# Patient Record
Sex: MALE | Race: WHITE | NOT HISPANIC OR LATINO | Employment: OTHER | URBAN - METROPOLITAN AREA
[De-identification: names, ages, dates, MRNs, and addresses within clinical notes are randomized per-mention and may not be internally consistent; named-entity substitution may affect disease eponyms.]

---

## 2017-03-14 ENCOUNTER — HOSPITAL ENCOUNTER (OUTPATIENT)
Dept: ONCOLOGY | Facility: CLINIC | Age: 37
Discharge: HOME | End: 2017-03-14
Attending: INTERNAL MEDICINE | Admitting: INTERNAL MEDICINE

## 2017-03-14 LAB
ALBUMIN SERPL-MCNC: 3.7 G/DL (ref 3.5–4.8)
ALBUMIN/GLOB SERPL: 1.3 {RATIO} (ref 1–1.7)
ALP SERPL-CCNC: 61 IU/L (ref 32–91)
ALT SERPL-CCNC: 34 IU/L (ref 17–63)
ANION GAP SERPL CALC-SCNC: 16.5 MMOL/L (ref 10–20)
AST SERPL-CCNC: 28 IU/L (ref 15–41)
BILIRUB SERPL-MCNC: 0.6 MG/DL (ref 0.3–1.2)
BUN SERPL-MCNC: 10 MG/DL (ref 8–20)
BUN/CREAT SERPL: 11.1 (ref 6.2–20.3)
CALCIUM SERPL-MCNC: 9.4 MG/DL (ref 8.9–10.3)
CHLORIDE SERPL-SCNC: 107 MMOL/L (ref 101–111)
CONV CO2: 23 MMOL/L (ref 22–32)
CONV TOTAL PROTEIN: 6.5 G/DL (ref 6.1–7.9)
CREAT UR-MCNC: 0.9 MG/DL (ref 0.7–1.2)
GLOBULIN UR ELPH-MCNC: 2.8 G/DL (ref 2.5–3.8)
GLUCOSE SERPL-MCNC: 96 MG/DL (ref 65–99)
POTASSIUM SERPL-SCNC: 4.5 MMOL/L (ref 3.6–5.1)
SODIUM SERPL-SCNC: 142 MMOL/L (ref 136–144)

## 2017-09-12 ENCOUNTER — CLINICAL SUPPORT (OUTPATIENT)
Dept: ONCOLOGY | Facility: HOSPITAL | Age: 37
End: 2017-09-12

## 2017-09-12 ENCOUNTER — HOSPITAL ENCOUNTER (OUTPATIENT)
Dept: ONCOLOGY | Facility: HOSPITAL | Age: 37
Discharge: HOME | End: 2017-09-12
Attending: INTERNAL MEDICINE | Admitting: INTERNAL MEDICINE

## 2017-09-12 ENCOUNTER — HOSPITAL ENCOUNTER (OUTPATIENT)
Dept: ONCOLOGY | Facility: CLINIC | Age: 37
Setting detail: INFUSION SERIES
Discharge: HOME | End: 2017-09-12
Attending: INTERNAL MEDICINE | Admitting: INTERNAL MEDICINE

## 2017-09-12 LAB
ALBUMIN SERPL-MCNC: 3.9 G/DL (ref 3.5–4.8)
ALBUMIN/GLOB SERPL: 1.3 {RATIO} (ref 1–1.7)
ALP SERPL-CCNC: 56 IU/L (ref 32–91)
ALT SERPL-CCNC: 29 IU/L (ref 17–63)
ANION GAP SERPL CALC-SCNC: 11.8 MMOL/L (ref 10–20)
AST SERPL-CCNC: 31 IU/L (ref 15–41)
BILIRUB SERPL-MCNC: 1 MG/DL (ref 0.3–1.2)
BUN SERPL-MCNC: 5 MG/DL (ref 8–20)
BUN/CREAT SERPL: 5.6 (ref 6.2–20.3)
CALCIUM SERPL-MCNC: 9.3 MG/DL (ref 8.9–10.3)
CHLORIDE SERPL-SCNC: 106 MMOL/L (ref 101–111)
CONV CO2: 24 MMOL/L (ref 22–32)
CONV TOTAL PROTEIN: 6.9 G/DL (ref 6.1–7.9)
CREAT UR-MCNC: 0.9 MG/DL (ref 0.7–1.2)
GLOBULIN UR ELPH-MCNC: 3 G/DL (ref 2.5–3.8)
GLUCOSE SERPL-MCNC: 103 MG/DL (ref 65–99)
POTASSIUM SERPL-SCNC: 3.8 MMOL/L (ref 3.6–5.1)
SODIUM SERPL-SCNC: 138 MMOL/L (ref 136–144)

## 2017-09-12 NOTE — PROGRESS NOTES
PATIENTS ONCOLOGY RECORD LOCATED IN [facility]      Subjective     Name:  ARMANDO FERRARI     Date:  2017  Address:  [address] [place] [address] [place] [facility]  Home: [phone]  :  1980 AGE:  36 y.o.        RECORDS OBTAINED:  Patients Oncology Record is located in [facility]

## 2017-10-11 ENCOUNTER — HOSPITAL ENCOUNTER (OUTPATIENT)
Dept: OTHER | Facility: HOSPITAL | Age: 37
Setting detail: SPECIMEN
Discharge: HOME | End: 2017-10-11
Attending: NURSE PRACTITIONER | Admitting: NURSE PRACTITIONER

## 2017-10-11 LAB
ALBUMIN SERPL-MCNC: 4.2 G/DL (ref 3.5–4.8)
ALBUMIN/GLOB SERPL: 1.4 {RATIO} (ref 1–1.7)
ALP SERPL-CCNC: 57 IU/L (ref 32–91)
ALT SERPL-CCNC: 18 IU/L (ref 17–63)
ANION GAP SERPL CALC-SCNC: 12.6 MMOL/L (ref 10–20)
AST SERPL-CCNC: 18 IU/L (ref 15–41)
BILIRUB SERPL-MCNC: 1.5 MG/DL (ref 0.3–1.2)
BUN SERPL-MCNC: 6 MG/DL (ref 8–20)
BUN/CREAT SERPL: 6.7 (ref 6.2–20.3)
CALCIUM SERPL-MCNC: 9.4 MG/DL (ref 8.9–10.3)
CHLORIDE SERPL-SCNC: 102 MMOL/L (ref 101–111)
CHOLEST SERPL-MCNC: 213 MG/DL
CHOLEST/HDLC SERPL: 6.2 {RATIO}
CONV CO2: 27 MMOL/L (ref 22–32)
CONV LDL CHOLESTEROL DIRECT: 152 MG/DL (ref 0–100)
CONV TOTAL PROTEIN: 7.3 G/DL (ref 6.1–7.9)
CREAT UR-MCNC: 0.9 MG/DL (ref 0.7–1.2)
GLOBULIN UR ELPH-MCNC: 3.1 G/DL (ref 2.5–3.8)
GLUCOSE SERPL-MCNC: 80 MG/DL (ref 65–99)
HDLC SERPL-MCNC: 34 MG/DL
LDLC/HDLC SERPL: 4.4 {RATIO}
LIPID INTERPRETATION: ABNORMAL
POTASSIUM SERPL-SCNC: 4.6 MMOL/L (ref 3.6–5.1)
SODIUM SERPL-SCNC: 137 MMOL/L (ref 136–144)
TRIGL SERPL-MCNC: 157 MG/DL
VLDLC SERPL CALC-MCNC: 26.7 MG/DL

## 2017-10-24 ENCOUNTER — HOSPITAL ENCOUNTER (OUTPATIENT)
Dept: ULTRASOUND IMAGING | Facility: HOSPITAL | Age: 37
Discharge: HOME | End: 2017-10-24
Attending: NURSE PRACTITIONER | Admitting: NURSE PRACTITIONER

## 2017-11-27 ENCOUNTER — OFFICE (OUTPATIENT)
Dept: URBAN - METROPOLITAN AREA CLINIC 64 | Facility: CLINIC | Age: 37
End: 2017-11-27
Payer: COMMERCIAL

## 2017-11-27 VITALS
HEART RATE: 77 BPM | WEIGHT: 240 LBS | SYSTOLIC BLOOD PRESSURE: 125 MMHG | HEIGHT: 69 IN | DIASTOLIC BLOOD PRESSURE: 73 MMHG

## 2017-11-27 DIAGNOSIS — R10.84 GENERALIZED ABDOMINAL PAIN: ICD-10-CM

## 2017-11-27 DIAGNOSIS — E80.6 OTHER DISORDERS OF BILIRUBIN METABOLISM: ICD-10-CM

## 2017-11-27 DIAGNOSIS — R19.4 CHANGE IN BOWEL HABIT: ICD-10-CM

## 2017-11-27 DIAGNOSIS — K21.9 GASTRO-ESOPHAGEAL REFLUX DISEASE WITHOUT ESOPHAGITIS: ICD-10-CM

## 2017-11-27 LAB
BILIRUBIN, TOTAL/DIRECT, SERUM: BILIRUBIN, DIRECT: 0.16 MG/DL (ref 0–0.4)
BILIRUBIN, TOTAL/DIRECT, SERUM: BILIRUBIN, INDIRECT: 0.44 MG/DL (ref 0.1–0.8)
BILIRUBIN, TOTAL/DIRECT, SERUM: BILIRUBIN, TOTAL: 0.6 MG/DL (ref 0–1.2)

## 2017-11-27 PROCEDURE — 99203 OFFICE O/P NEW LOW 30 MIN: CPT | Performed by: INTERNAL MEDICINE

## 2017-11-27 RX ORDER — DICYCLOMINE HYDROCHLORIDE 20 MG/1
80 TABLET ORAL
Qty: 120 | Refills: 12 | Status: COMPLETED
Start: 2017-11-27 | End: 2024-02-23

## 2018-02-20 ENCOUNTER — HOSPITAL ENCOUNTER (OUTPATIENT)
Dept: LAB | Facility: HOSPITAL | Age: 38
Discharge: HOME | End: 2018-02-20
Attending: PSYCHIATRY & NEUROLOGY | Admitting: PSYCHIATRY & NEUROLOGY

## 2018-02-20 LAB
ALBUMIN SERPL-MCNC: 3.8 G/DL (ref 3.5–4.8)
ALBUMIN/GLOB SERPL: 1.4 {RATIO} (ref 1–1.7)
ALP SERPL-CCNC: 46 IU/L (ref 32–91)
ALT SERPL-CCNC: 13 IU/L (ref 17–63)
ANION GAP SERPL CALC-SCNC: 12.3 MMOL/L (ref 10–20)
AST SERPL-CCNC: 15 IU/L (ref 15–41)
BASOPHILS # BLD AUTO: 0.1 10*3/UL (ref 0–0.2)
BASOPHILS NFR BLD AUTO: 1 % (ref 0–2)
BILIRUB SERPL-MCNC: 0.9 MG/DL (ref 0.3–1.2)
BUN SERPL-MCNC: 12 MG/DL (ref 8–20)
BUN/CREAT SERPL: 10.9 (ref 6.2–20.3)
CALCIUM SERPL-MCNC: 9.4 MG/DL (ref 8.9–10.3)
CHLORIDE SERPL-SCNC: 105 MMOL/L (ref 101–111)
CONV CO2: 24 MMOL/L (ref 22–32)
CONV TOTAL PROTEIN: 6.5 G/DL (ref 6.1–7.9)
CREAT UR-MCNC: 1.1 MG/DL (ref 0.7–1.2)
DIFFERENTIAL METHOD BLD: (no result)
EOSINOPHIL # BLD AUTO: 0.5 10*3/UL (ref 0–0.3)
EOSINOPHIL # BLD AUTO: 5 % (ref 0–3)
ERYTHROCYTE [DISTWIDTH] IN BLOOD BY AUTOMATED COUNT: 13.2 % (ref 11.5–14.5)
GLOBULIN UR ELPH-MCNC: 2.7 G/DL (ref 2.5–3.8)
GLUCOSE SERPL-MCNC: 94 MG/DL (ref 65–99)
HCT VFR BLD AUTO: 43.2 % (ref 40–54)
HGB BLD-MCNC: 14.8 G/DL (ref 14–18)
LITHIUM SERPL-SCNC: 0.7 MEQ/L (ref 1–1.2)
LYMPHOCYTES # BLD AUTO: 2.9 10*3/UL (ref 0.8–4.8)
LYMPHOCYTES NFR BLD AUTO: 25 % (ref 18–42)
MCH RBC QN AUTO: 30.6 PG (ref 26–32)
MCHC RBC AUTO-ENTMCNC: 34.4 G/DL (ref 32–36)
MCV RBC AUTO: 89.1 FL (ref 80–94)
MONOCYTES # BLD AUTO: 0.7 10*3/UL (ref 0.1–1.3)
MONOCYTES NFR BLD AUTO: 6 % (ref 2–11)
NEUTROPHILS # BLD AUTO: 7.2 10*3/UL (ref 2.3–8.6)
NEUTROPHILS NFR BLD AUTO: 63 % (ref 50–75)
NRBC BLD AUTO-RTO: 0 /100{WBCS}
NRBC/RBC NFR BLD MANUAL: 0 10*3/UL
PLATELET # BLD AUTO: 273 10*3/UL (ref 150–450)
PMV BLD AUTO: 9.9 FL (ref 7.4–10.4)
POTASSIUM SERPL-SCNC: 4.3 MMOL/L (ref 3.6–5.1)
RBC # BLD AUTO: 4.84 10*6/UL (ref 4.6–6)
SODIUM SERPL-SCNC: 137 MMOL/L (ref 136–144)
VALPROATE SERPL-MCNC: 109.9 UG/ML (ref 50–100)
WBC # BLD AUTO: 11.3 10*3/UL (ref 4.5–11.5)

## 2018-02-28 ENCOUNTER — HOSPITAL ENCOUNTER (OUTPATIENT)
Dept: CARDIOLOGY | Facility: HOSPITAL | Age: 38
Discharge: HOME | End: 2018-02-28
Attending: INTERNAL MEDICINE | Admitting: INTERNAL MEDICINE

## 2018-03-13 ENCOUNTER — CLINICAL SUPPORT (OUTPATIENT)
Dept: ONCOLOGY | Facility: HOSPITAL | Age: 38
End: 2018-03-13

## 2018-03-13 ENCOUNTER — HOSPITAL ENCOUNTER (OUTPATIENT)
Dept: ONCOLOGY | Facility: CLINIC | Age: 38
Setting detail: INFUSION SERIES
Discharge: HOME | End: 2018-03-13
Attending: INTERNAL MEDICINE | Admitting: INTERNAL MEDICINE

## 2018-03-13 ENCOUNTER — HOSPITAL ENCOUNTER (OUTPATIENT)
Dept: ONCOLOGY | Facility: HOSPITAL | Age: 38
Discharge: HOME | End: 2018-03-13
Attending: INTERNAL MEDICINE | Admitting: INTERNAL MEDICINE

## 2018-03-13 LAB
ALBUMIN SERPL-MCNC: 4.2 G/DL (ref 3.5–4.8)
ALBUMIN/GLOB SERPL: 1.6 {RATIO} (ref 1–1.7)
ALP SERPL-CCNC: 49 IU/L (ref 32–91)
ALT SERPL-CCNC: 28 IU/L (ref 17–63)
ANION GAP SERPL CALC-SCNC: 10.6 MMOL/L (ref 10–20)
AST SERPL-CCNC: 26 IU/L (ref 15–41)
BILIRUB SERPL-MCNC: 1.2 MG/DL (ref 0.3–1.2)
BUN SERPL-MCNC: 11 MG/DL (ref 8–20)
BUN/CREAT SERPL: 11 (ref 6.2–20.3)
CALCIUM SERPL-MCNC: 9.3 MG/DL (ref 8.9–10.3)
CHLORIDE SERPL-SCNC: 106 MMOL/L (ref 101–111)
CONV CO2: 23 MMOL/L (ref 22–32)
CONV TOTAL PROTEIN: 6.8 G/DL (ref 6.1–7.9)
CREAT UR-MCNC: 1 MG/DL (ref 0.7–1.2)
GLOBULIN UR ELPH-MCNC: 2.6 G/DL (ref 2.5–3.8)
GLUCOSE SERPL-MCNC: 127 MG/DL (ref 65–99)
POTASSIUM SERPL-SCNC: 3.6 MMOL/L (ref 3.6–5.1)
SODIUM SERPL-SCNC: 136 MMOL/L (ref 136–144)

## 2018-03-13 NOTE — PROGRESS NOTES
PATIENTS ONCOLOGY RECORD LOCATED IN [facility]      Subjective     Name:  ARMANDO CASTELLANOSWELL     Date:  2018  Address:  [address] [place] [address] [place] [facility]  Home: [phone]  :  1980 AGE:  37 y.o.        RECORDS OBTAINED:  Patients Oncology Record is located in [facility]

## 2018-07-03 ENCOUNTER — HOSPITAL ENCOUNTER (OUTPATIENT)
Dept: OTHER | Facility: HOSPITAL | Age: 38
Setting detail: SPECIMEN
Discharge: HOME | End: 2018-07-03
Attending: NURSE PRACTITIONER | Admitting: NURSE PRACTITIONER

## 2018-07-03 LAB
ALBUMIN SERPL-MCNC: 4.4 G/DL (ref 3.5–4.8)
ALBUMIN/GLOB SERPL: 1.3 {RATIO} (ref 1–1.7)
ALP SERPL-CCNC: 60 IU/L (ref 32–91)
ALT SERPL-CCNC: 28 IU/L (ref 17–63)
ANION GAP SERPL CALC-SCNC: 13.9 MMOL/L (ref 10–20)
AST SERPL-CCNC: 27 IU/L (ref 15–41)
BASOPHILS # BLD AUTO: 0 10*3/UL (ref 0–0.2)
BASOPHILS NFR BLD AUTO: 0 % (ref 0–2)
BILIRUB SERPL-MCNC: 1 MG/DL (ref 0.3–1.2)
BUN SERPL-MCNC: 10 MG/DL (ref 8–20)
BUN/CREAT SERPL: 10 (ref 6.2–20.3)
CALCIUM SERPL-MCNC: 9.8 MG/DL (ref 8.9–10.3)
CHLORIDE SERPL-SCNC: 102 MMOL/L (ref 101–111)
CHOLEST SERPL-MCNC: 213 MG/DL
CHOLEST/HDLC SERPL: 6.4 {RATIO}
CONV ANISOCYTES: SLIGHT
CONV CO2: 24 MMOL/L (ref 22–32)
CONV LDL CHOLESTEROL DIRECT: 146 MG/DL (ref 0–100)
CONV SMEAR REVIEW: (no result)
CONV TOTAL PROTEIN: 7.8 G/DL (ref 6.1–7.9)
CREAT UR-MCNC: 1 MG/DL (ref 0.7–1.2)
DIFFERENTIAL METHOD BLD: (no result)
EOSINOPHIL # BLD AUTO: 0.7 10*3/UL (ref 0–0.3)
EOSINOPHIL # BLD AUTO: 5 % (ref 0–3)
ERYTHROCYTE [DISTWIDTH] IN BLOOD BY AUTOMATED COUNT: 12.6 % (ref 11.5–14.5)
GLOBULIN UR ELPH-MCNC: 3.4 G/DL (ref 2.5–3.8)
GLUCOSE SERPL-MCNC: 111 MG/DL (ref 65–99)
HCT VFR BLD AUTO: 45.3 % (ref 40–54)
HDLC SERPL-MCNC: 33 MG/DL
HGB BLD-MCNC: 15.1 G/DL (ref 14–18)
LDLC/HDLC SERPL: 4.4 {RATIO}
LIPID INTERPRETATION: ABNORMAL
LYMPHOCYTES # BLD AUTO: 2.4 10*3/UL (ref 0.8–4.8)
LYMPHOCYTES NFR BLD AUTO: 18 % (ref 18–42)
MCH RBC QN AUTO: 30.2 PG (ref 26–32)
MCHC RBC AUTO-ENTMCNC: 33.3 G/DL (ref 32–36)
MCV RBC AUTO: 90.6 FL (ref 80–94)
MONOCYTES # BLD AUTO: 0.8 10*3/UL (ref 0.1–1.3)
MONOCYTES NFR BLD AUTO: 6 % (ref 2–11)
NEUTROPHILS # BLD AUTO: 9.4 10*3/UL (ref 2.3–8.6)
NEUTROPHILS NFR BLD AUTO: 71 % (ref 50–75)
NRBC BLD AUTO-RTO: 0 /100{WBCS}
PLATELET # BLD AUTO: 261 10*3/UL (ref 150–450)
PMV BLD AUTO: 11.7 FL (ref 7.4–10.4)
POTASSIUM SERPL-SCNC: 3.9 MMOL/L (ref 3.6–5.1)
RBC # BLD AUTO: 5 10*6/UL (ref 4.6–6)
SODIUM SERPL-SCNC: 136 MMOL/L (ref 136–144)
TRIGL SERPL-MCNC: 353 MG/DL
VLDLC SERPL CALC-MCNC: 33.6 MG/DL
WBC # BLD AUTO: 13.3 10*3/UL (ref 4.5–11.5)

## 2018-09-27 ENCOUNTER — HOSPITAL ENCOUNTER (OUTPATIENT)
Dept: ONCOLOGY | Facility: CLINIC | Age: 38
Setting detail: INFUSION SERIES
Discharge: HOME | End: 2018-09-27
Attending: INTERNAL MEDICINE | Admitting: INTERNAL MEDICINE

## 2018-09-27 ENCOUNTER — CLINICAL SUPPORT (OUTPATIENT)
Dept: ONCOLOGY | Facility: HOSPITAL | Age: 38
End: 2018-09-27

## 2018-09-27 NOTE — PROGRESS NOTES
PATIENTS ONCOLOGY RECORD LOCATED IN [facility]      Subjective     Name:  ARMANDO TELLODWELL     Date:  2018  Address:  [address] [place] [address] [place] [facility]  Home: [phone]  :  1980 AGE:  37 y.o.        RECORDS OBTAINED:  Patients Oncology Record is located in [facility]

## 2019-01-14 ENCOUNTER — HOSPITAL ENCOUNTER (OUTPATIENT)
Dept: LAB | Facility: HOSPITAL | Age: 39
Discharge: HOME | End: 2019-01-14
Attending: PSYCHIATRY & NEUROLOGY | Admitting: PSYCHIATRY & NEUROLOGY

## 2019-01-14 LAB
ALBUMIN SERPL-MCNC: 4 G/DL (ref 3.5–4.8)
ALBUMIN/GLOB SERPL: 1.3 {RATIO} (ref 1–1.7)
ALP SERPL-CCNC: 59 IU/L (ref 32–91)
ALT SERPL-CCNC: 17 IU/L (ref 17–63)
ANION GAP SERPL CALC-SCNC: 12.6 MMOL/L (ref 10–20)
AST SERPL-CCNC: 17 IU/L (ref 15–41)
BASOPHILS # BLD AUTO: 0.1 10*3/UL (ref 0–0.2)
BASOPHILS NFR BLD AUTO: 1 % (ref 0–2)
BILIRUB SERPL-MCNC: 1 MG/DL (ref 0.3–1.2)
BUN SERPL-MCNC: 7 MG/DL (ref 8–20)
BUN/CREAT SERPL: 7.8 (ref 6.2–20.3)
CALCIUM SERPL-MCNC: 9.3 MG/DL (ref 8.9–10.3)
CHLORIDE SERPL-SCNC: 105 MMOL/L (ref 101–111)
CONV CO2: 26 MMOL/L (ref 22–32)
CONV TOTAL PROTEIN: 7.1 G/DL (ref 6.1–7.9)
CREAT UR-MCNC: 0.9 MG/DL (ref 0.7–1.2)
DIFFERENTIAL METHOD BLD: (no result)
EOSINOPHIL # BLD AUTO: 0.8 10*3/UL (ref 0–0.3)
EOSINOPHIL # BLD AUTO: 10 % (ref 0–3)
ERYTHROCYTE [DISTWIDTH] IN BLOOD BY AUTOMATED COUNT: 13.6 % (ref 11.5–14.5)
GLOBULIN UR ELPH-MCNC: 3.1 G/DL (ref 2.5–3.8)
GLUCOSE SERPL-MCNC: 95 MG/DL (ref 65–99)
HCT VFR BLD AUTO: 45.9 % (ref 40–54)
HGB BLD-MCNC: 15.4 G/DL (ref 14–18)
LITHIUM SERPL-SCNC: 0.7 MEQ/L (ref 1–1.2)
LYMPHOCYTES # BLD AUTO: 1.8 10*3/UL (ref 0.8–4.8)
LYMPHOCYTES NFR BLD AUTO: 22 % (ref 18–42)
MCH RBC QN AUTO: 30.9 PG (ref 26–32)
MCHC RBC AUTO-ENTMCNC: 33.5 G/DL (ref 32–36)
MCV RBC AUTO: 92.1 FL (ref 80–94)
MONOCYTES # BLD AUTO: 0.5 10*3/UL (ref 0.1–1.3)
MONOCYTES NFR BLD AUTO: 6 % (ref 2–11)
NEUTROPHILS # BLD AUTO: 5.1 10*3/UL (ref 2.3–8.6)
NEUTROPHILS NFR BLD AUTO: 61 % (ref 50–75)
NRBC BLD AUTO-RTO: 0 /100{WBCS}
NRBC/RBC NFR BLD MANUAL: 0 10*3/UL
PLATELET # BLD AUTO: 230 10*3/UL (ref 150–450)
PMV BLD AUTO: 10.1 FL (ref 7.4–10.4)
POTASSIUM SERPL-SCNC: 4.6 MMOL/L (ref 3.6–5.1)
RBC # BLD AUTO: 4.98 10*6/UL (ref 4.6–6)
SODIUM SERPL-SCNC: 139 MMOL/L (ref 136–144)
VALPROATE SERPL-MCNC: 80.6 UG/ML (ref 50–100)
WBC # BLD AUTO: 8.3 10*3/UL (ref 4.5–11.5)

## 2019-03-28 ENCOUNTER — CLINICAL SUPPORT (OUTPATIENT)
Dept: ONCOLOGY | Facility: HOSPITAL | Age: 39
End: 2019-03-28

## 2019-03-28 ENCOUNTER — HOSPITAL ENCOUNTER (OUTPATIENT)
Dept: ONCOLOGY | Facility: CLINIC | Age: 39
Setting detail: INFUSION SERIES
Discharge: HOME | End: 2019-03-28
Attending: INTERNAL MEDICINE | Admitting: INTERNAL MEDICINE

## 2019-03-28 ENCOUNTER — HOSPITAL ENCOUNTER (OUTPATIENT)
Dept: ONCOLOGY | Facility: HOSPITAL | Age: 39
Discharge: HOME | End: 2019-03-28
Attending: INTERNAL MEDICINE | Admitting: INTERNAL MEDICINE

## 2019-03-28 LAB
ALBUMIN SERPL-MCNC: 4.5 G/DL (ref 3.5–4.8)
ALBUMIN/GLOB SERPL: 1.3 {RATIO} (ref 1–1.7)
ALP SERPL-CCNC: 55 IU/L (ref 32–91)
ALT SERPL-CCNC: 20 IU/L (ref 17–63)
ANION GAP SERPL CALC-SCNC: 12 MMOL/L (ref 10–20)
AST SERPL-CCNC: 18 IU/L (ref 15–41)
BILIRUB SERPL-MCNC: 1.5 MG/DL (ref 0.3–1.2)
BUN SERPL-MCNC: 12 MG/DL (ref 8–20)
BUN/CREAT SERPL: 15 (ref 6.2–20.3)
CALCIUM SERPL-MCNC: 9.6 MG/DL (ref 8.9–10.3)
CHLORIDE SERPL-SCNC: 103 MMOL/L (ref 101–111)
CONV CO2: 25 MMOL/L (ref 22–32)
CONV TOTAL PROTEIN: 7.9 G/DL (ref 6.1–7.9)
CREAT UR-MCNC: 0.8 MG/DL (ref 0.7–1.2)
GLOBULIN UR ELPH-MCNC: 3.4 G/DL (ref 2.5–3.8)
GLUCOSE SERPL-MCNC: 95 MG/DL (ref 65–99)
POTASSIUM SERPL-SCNC: 4 MMOL/L (ref 3.6–5.1)
SODIUM SERPL-SCNC: 136 MMOL/L (ref 136–144)

## 2019-03-28 NOTE — PROGRESS NOTES
PATIENTS ONCOLOGY RECORD LOCATED IN [facility]      Subjective     Name:  ARMANDO HERCULES VEGA     Date:  2019  Address:  [address] [place] [address] [place] [facility]  Home: [unfilled]  :  1980 AGE:  38 y.o.        RECORDS OBTAINED:  Patients Oncology Record is located in [facility]

## 2019-06-26 RX ORDER — DEXLANSOPRAZOLE 60 MG/1
CAPSULE, DELAYED RELEASE ORAL
Qty: 30 CAPSULE | Refills: 1 | Status: SHIPPED | OUTPATIENT
Start: 2019-06-26 | End: 2019-09-03 | Stop reason: SDUPTHER

## 2019-06-27 RX ORDER — LEVOTHYROXINE SODIUM 0.03 MG/1
TABLET ORAL
Qty: 90 TABLET | Refills: 0 | Status: SHIPPED | OUTPATIENT
Start: 2019-06-27 | End: 2019-09-20 | Stop reason: SDUPTHER

## 2019-09-03 RX ORDER — APIXABAN 5 MG/1
TABLET, FILM COATED ORAL
Qty: 60 TABLET | Refills: 8 | Status: SHIPPED | OUTPATIENT
Start: 2019-09-03 | End: 2020-10-05 | Stop reason: ALTCHOICE

## 2019-09-03 RX ORDER — DEXLANSOPRAZOLE 60 MG/1
60 CAPSULE, DELAYED RELEASE ORAL DAILY
Qty: 30 CAPSULE | Refills: 2 | Status: SHIPPED | OUTPATIENT
Start: 2019-09-03 | End: 2019-12-03 | Stop reason: SDUPTHER

## 2019-09-23 RX ORDER — LEVOTHYROXINE SODIUM 0.03 MG/1
25 TABLET ORAL DAILY
Qty: 90 TABLET | Refills: 0 | Status: SHIPPED | OUTPATIENT
Start: 2019-09-23 | End: 2021-02-23

## 2019-09-26 ENCOUNTER — APPOINTMENT (OUTPATIENT)
Dept: ONCOLOGY | Facility: CLINIC | Age: 39
End: 2019-09-26

## 2019-09-26 ENCOUNTER — TELEPHONE (OUTPATIENT)
Dept: ONCOLOGY | Facility: CLINIC | Age: 39
End: 2019-09-26

## 2019-09-26 ENCOUNTER — APPOINTMENT (OUTPATIENT)
Dept: LAB | Facility: HOSPITAL | Age: 39
End: 2019-09-26

## 2019-09-26 NOTE — TELEPHONE ENCOUNTER
Mr. Martínez left message verifying his appt was canceled for today via answering service, and he wanted to reschedule.  Returned call, rescheduled appt.

## 2019-09-26 NOTE — PROGRESS NOTES
Hematology/Oncology Outpatient Follow Up    PATIENT NAME:Jesus Martínez  :1980  MRN: [id number]  PRIMARY CARE PHYSICIAN: Gissel Yanez APRN  REFERRING PHYSICIAN: Gissel Yanez APRN    No chief complaint on file.       HISTORY OF PRESENT ILLNESS:   This is a 38-year-old male who developed DVT involving the left lower extremity back in .  At the time patient was seen by Dr. Harding, was diagnosed with positive lupus anticoagulant and has since then been on Eliquis.  He has had chronic pain, swelling involving the left lower extremity.  He has had several Doppler studies done to rule out an acute DVT.  The first Doppler done in 2015 revealed patient had thrombosis in the popliteal vein extending into the posterior tibial veins on the left side.  On 16 due to symptoms of pain and swelling, he had a repeat Doppler study which showed findings of chronic venous thrombosis of the left lower extremity deep venous system involving the left superficial femoral vein and popliteal vein with recanalization and formation of collaterals in the thigh.  Most recently on 16 patient was seen in the emergency room for chronic pain, swelling involving the left lower extremity.  Doppler done showed chronic deep venous thrombosis of the left superficial femoral vein and popliteal vein.  Patient is here today to have further discussions why his clot has not resolved if the Eliquis is working.  He denies chest pain, shortness of breath.  He has no prior history of thrombosis elsewhere in the past.  He wa accompanied by his mother for the appointment on 16.     • Since his last visit I have had more records released from Dr. Harding’s office.  On 12/1/15 apparently patient had hypercoagulable work up which was negative for protein C, factor V Leiden, prothrombin gene mutation, anticardiolipin antibodies and beta-2 glycoprotein antibody, but positive for lupus anticoagulant.  He had a repeat lupus  anticoagulant on 3/16/16 which was negative.    • 12/13/16 - Negative mixing study for inhibitor effect in PTT.  Lupus inhibitor was not identified.    • 2/28/28 - Doppler ultrasound of the left lower extremity due to complaint of swelling.  This came back negative for acute DVT, but he does have chronic DVT in the left femoral vein.    • Patient’s comprehensive genetic analysis with [employer] technology was negative for any significant mutation in his mother.   • 7/3/18 – CMP:  BUN 10.  Creatinine 1.  Liver function tests, normal.      No past medical history on file.    No past surgical history on file.      Current Outpatient Medications:   •  dexlansoprazole (DEXILANT) 60 MG capsule, Take 1 capsule by mouth Daily., Disp: 30 capsule, Rfl: 2  •  ELIQUIS 5 MG tablet tablet, TAKE 1 TABLET TWICE A DAY, Disp: 60 tablet, Rfl: 8  •  levothyroxine (SYNTHROID, LEVOTHROID) 25 MCG tablet, Take 1 tablet by mouth Daily., Disp: 90 tablet, Rfl: 0    Allergies not on file    No family history on file.    Cancer-related family history is not on file.    Social History     Tobacco Use   • Smoking status: Not on file   Substance Use Topics   • Alcohol use: Not on file   • Drug use: Not on file       I have reviewed the history of present illness, past medical history, family history, social history, lab results, all notes and other records since the patient was last seen on 8/31/2019.    SUBJECTIVE:            REVIEW OF SYSTEMS:  Review of Systems    OBJECTIVE:    There were no vitals filed for this visit.    ECOG  {[facility] ECOG Status:32388}    Physical Exam    RECENT LABS  WBC   Date Value Ref Range Status   01/14/2019 8.3 4.5 - 11.5 10*3/uL Final     RBC   Date Value Ref Range Status   01/14/2019 4.98 4.60 - 6.00 10*6/uL Final     Hemoglobin   Date Value Ref Range Status   01/14/2019 15.4 14.0 - 18.0 g/dL Final     Hematocrit   Date Value Ref Range Status   01/14/2019 45.9 40 - 54 % Final     MCV   Date Value Ref  Range Status   01/14/2019 92.1 80 - 94 fL Final     MCH   Date Value Ref Range Status   01/14/2019 30.9 26 - 32 pg Final     MCHC   Date Value Ref Range Status   01/14/2019 33.5 32 - 36 g/dL Final     RDW   Date Value Ref Range Status   01/14/2019 13.6 11.5 - 14.5 % Final     MPV   Date Value Ref Range Status   01/14/2019 10.1 7.4 - 10.4 fL Final     Platelets   Date Value Ref Range Status   01/14/2019 230 150 - 450 10*3/uL Final     Neutrophil Rel %   Date Value Ref Range Status   01/14/2019 61 50 - 75 % Final     Lymphocyte Rel %   Date Value Ref Range Status   01/14/2019 22 18 - 42 % Final     Monocyte Rel %   Date Value Ref Range Status   01/14/2019 6 2 - 11 % Final     Eosinophil Rel %   Date Value Ref Range Status   01/14/2019 10 (H) 0 - 3 % Final     Basophil Rel %   Date Value Ref Range Status   01/14/2019 1 0 - 2 % Final     Neutrophils Absolute   Date Value Ref Range Status   01/14/2019 5.1 2.3 - 8.6 10*3/uL Final     Lymphocytes Absolute   Date Value Ref Range Status   01/14/2019 1.8 0.8 - 4.8 10*3/uL Final     Monocytes Absolute   Date Value Ref Range Status   01/14/2019 0.5 0.1 - 1.3 10*3/uL Final     Eosinophils Absolute   Date Value Ref Range Status   01/14/2019 0.8 (H) 0.0 - 0.3 10*3/uL Final     Basophils Absolute   Date Value Ref Range Status   01/14/2019 0.1 0 - 0.2 10*3/uL Final     nRBC   Date Value Ref Range Status   01/14/2019 0 0 /100[WBCs] Final       Lab Results   Component Value Date    GLUCOSE 95 03/28/2019    BUN 12 03/28/2019    CREATININE 0.8 03/28/2019    BCR 15.0 03/28/2019    K 4.0 03/28/2019    CO2 25 03/28/2019    CALCIUM 9.6 03/28/2019    ALBUMIN 4.5 03/28/2019    LABIL2 1.3 03/28/2019    AST 18 03/28/2019    ALT 20 03/28/2019         Assessment/Plan     There are no diagnoses linked to this encounter.    ASSESSMENT:    1. Chronic deep venous thrombosis involving the left lower extremity.    2. History of positive lupus anticoagulant.   3. On Eliquis.   4. Patient has strong  family history of cancer on the maternal side of the family.  His mother’s comprehensive gene test was negative.  Patient does not need genetic testing at this time unless there is family history of cancer on the paternal side.     PLAN:     Patient will continue Eliquis.  A CMP will be drawn today.  I will see him again in six months.  He will let me know should he be scheduled for any surgical procedures in the near future.           I have reviewed labs results, imaging, vitals, and medications with the patient today. Will follow up in *** months with ***.     I counselled Jesus of the risks of continuing to use tobacco and cessation.    During this visit, I spent 3-10 minutes counseling the patient regarding tobacco cessation.     Patient verbalized understanding and is in agreement of the above plan.      Much of the above report is an electronic transcription/translation of the spoken language to printed text using Dragon Software. As such, the subtleties and finesse of the spoken language may permit erroneous, or at times, nonsensical words or phrases to be inadvertently transcribed; thus changes may be made at a later date to rectify these errors.

## 2019-10-03 ENCOUNTER — APPOINTMENT (OUTPATIENT)
Dept: LAB | Facility: HOSPITAL | Age: 39
End: 2019-10-03

## 2019-10-03 ENCOUNTER — OFFICE VISIT (OUTPATIENT)
Dept: ONCOLOGY | Facility: CLINIC | Age: 39
End: 2019-10-03

## 2019-10-03 VITALS
BODY MASS INDEX: 36.76 KG/M2 | SYSTOLIC BLOOD PRESSURE: 108 MMHG | WEIGHT: 248.2 LBS | HEART RATE: 74 BPM | DIASTOLIC BLOOD PRESSURE: 70 MMHG | HEIGHT: 69 IN | RESPIRATION RATE: 18 BRPM | TEMPERATURE: 98.1 F

## 2019-10-03 DIAGNOSIS — D68.59 THROMBOPHILIA (HCC): Primary | ICD-10-CM

## 2019-10-03 LAB
ALBUMIN SERPL-MCNC: 3.9 G/DL (ref 3.5–4.8)
ALBUMIN/GLOB SERPL: 1.4 G/DL (ref 1–1.7)
ALP SERPL-CCNC: 41 U/L (ref 32–91)
ALT SERPL W P-5'-P-CCNC: 28 U/L (ref 17–63)
ANION GAP SERPL CALCULATED.3IONS-SCNC: 11.9 MMOL/L (ref 5–15)
AST SERPL-CCNC: 26 U/L (ref 15–41)
BASOPHILS # BLD AUTO: 0.05 10*3/MM3 (ref 0–0.2)
BASOPHILS NFR BLD AUTO: 0.7 % (ref 0–1.5)
BILIRUB SERPL-MCNC: 0.5 MG/DL (ref 0.3–1.2)
BUN BLD-MCNC: 7 MG/DL (ref 8–20)
BUN/CREAT SERPL: 6.4 (ref 6.2–20.3)
CALCIUM SPEC-SCNC: 9 MG/DL (ref 8.9–10.3)
CHLORIDE SERPL-SCNC: 107 MMOL/L (ref 101–111)
CO2 SERPL-SCNC: 25 MMOL/L (ref 22–32)
CREAT BLD-MCNC: 1.1 MG/DL (ref 0.7–1.2)
DEPRECATED RDW RBC AUTO: 42.5 FL (ref 37–54)
EOSINOPHIL # BLD AUTO: 0.3 10*3/MM3 (ref 0–0.4)
EOSINOPHIL NFR BLD AUTO: 4.2 % (ref 0.3–6.2)
ERYTHROCYTE [DISTWIDTH] IN BLOOD BY AUTOMATED COUNT: 13.2 % (ref 12.3–15.4)
GFR SERPL CREATININE-BSD FRML MDRD: 76 ML/MIN/1.73
GLOBULIN UR ELPH-MCNC: 2.8 GM/DL (ref 2.5–3.8)
GLUCOSE BLD-MCNC: 104 MG/DL (ref 65–99)
HCT VFR BLD AUTO: 42.7 % (ref 37.5–51)
HGB BLD-MCNC: 14.6 G/DL (ref 13–17.7)
LYMPHOCYTES # BLD AUTO: 2.27 10*3/MM3 (ref 0.7–3.1)
LYMPHOCYTES NFR BLD AUTO: 31.6 % (ref 19.6–45.3)
MCH RBC QN AUTO: 30.7 PG (ref 26.6–33)
MCHC RBC AUTO-ENTMCNC: 34.2 G/DL (ref 31.5–35.7)
MCV RBC AUTO: 89.7 FL (ref 79–97)
MONOCYTES # BLD AUTO: 0.54 10*3/MM3 (ref 0.1–0.9)
MONOCYTES NFR BLD AUTO: 7.5 % (ref 5–12)
NEUTROPHILS # BLD AUTO: 4.03 10*3/MM3 (ref 1.7–7)
NEUTROPHILS NFR BLD AUTO: 56 % (ref 42.7–76)
PLATELET # BLD AUTO: 236 10*3/MM3 (ref 140–450)
PMV BLD AUTO: 12.4 FL (ref 6–12)
POTASSIUM BLD-SCNC: 3.9 MMOL/L (ref 3.6–5.1)
PROT SERPL-MCNC: 6.7 G/DL (ref 6.1–7.9)
RBC # BLD AUTO: 4.76 10*6/MM3 (ref 4.14–5.8)
SODIUM BLD-SCNC: 140 MMOL/L (ref 136–144)
WBC NRBC COR # BLD: 7.19 10*3/MM3 (ref 3.4–10.8)

## 2019-10-03 PROCEDURE — 99214 OFFICE O/P EST MOD 30 MIN: CPT | Performed by: INTERNAL MEDICINE

## 2019-10-03 PROCEDURE — 80053 COMPREHEN METABOLIC PANEL: CPT | Performed by: NURSE PRACTITIONER

## 2019-10-03 PROCEDURE — 85025 COMPLETE CBC W/AUTO DIFF WBC: CPT | Performed by: INTERNAL MEDICINE

## 2019-10-03 RX ORDER — LITHIUM CARBONATE 450 MG
450 TABLET, EXTENDED RELEASE ORAL 2 TIMES DAILY
Refills: 1 | COMMUNITY
Start: 2019-08-10 | End: 2021-02-23

## 2019-10-03 RX ORDER — LITHIUM CARBONATE 300 MG
300 TABLET ORAL DAILY
Refills: 3 | COMMUNITY
Start: 2019-09-05 | End: 2021-02-23

## 2019-10-03 RX ORDER — FLUTICASONE PROPIONATE 50 MCG
SPRAY, SUSPENSION (ML) NASAL
COMMUNITY
Start: 2018-04-19

## 2019-10-03 RX ORDER — DIVALPROEX SODIUM 500 MG/1
1500 TABLET, EXTENDED RELEASE ORAL DAILY
Refills: 0 | COMMUNITY
Start: 2019-08-17

## 2019-10-03 RX ORDER — ESCITALOPRAM OXALATE 10 MG/1
10 TABLET ORAL DAILY
Refills: 3 | COMMUNITY
Start: 2019-09-13

## 2019-10-03 NOTE — PROGRESS NOTES
Hematology/Oncology Outpatient Follow Up    PATIENT NAME:Jesus Martínez  :1980  MRN: [id number]  PRIMARY CARE PHYSICIAN: Casimiro Adams MD  REFERRING PHYSICIAN: Gissel Yanez APRN    Chief Complaint   Patient presents with   • Follow-up     Thrombophilia        HISTORY OF PRESENT ILLNESS:   This is a 38-year-old male who developed DVT involving the left lower extremity back in .  At the time patient was seen by Dr. Harding, was diagnosed with positive lupus anticoagulant and has since then been on Eliquis.  He has had chronic pain, swelling involving the left lower extremity.  He has had several Doppler studies done to rule out an acute DVT.  The first Doppler done in 2015 revealed patient had thrombosis in the popliteal vein extending into the posterior tibial veins on the left side.  On 16 due to symptoms of pain and swelling, he had a repeat Doppler study which showed findings of chronic venous thrombosis of the left lower extremity deep venous system involving the left superficial femoral vein and popliteal vein with recanalization and formation of collaterals in the thigh.  Most recently on 16 patient was seen in the emergency room for chronic pain, swelling involving the left lower extremity.  Doppler done showed chronic deep venous thrombosis of the left superficial femoral vein and popliteal vein.  Patient is here today to have further discussions why his clot has not resolved if the Eliquis is working.  He denies chest pain, shortness of breath.  He has no prior history of thrombosis elsewhere in the past.  He wa accompanied by his mother for the appointment on 16.     • Since his last visit I have had more records released from Dr. Harding’s office.  On 12/1/15 apparently patient had hypercoagulable work up which was negative for protein C, factor V Leiden, prothrombin gene mutation, anticardiolipin antibodies and beta-2 glycoprotein antibody, but positive  for lupus anticoagulant.  He had a repeat lupus anticoagulant on 3/16/16 which was negative.    • 12/13/16 - Negative mixing study for inhibitor effect in PTT.  Lupus inhibitor was not identified.    • 2/28/28 - Doppler ultrasound of the left lower extremity due to complaint of swelling.  This came back negative for acute DVT, but he does have chronic DVT in the left femoral vein.    • Patient’s comprehensive genetic analysis with [employer] technology was negative for any significant mutation in his mother.   • 7/3/18 - CMP:  BUN 10.  Creatinine 1.  Liver function tests, normal.      Past Medical History:   Diagnosis Date   • Anxiety    • Bipolar disorder (manic depression) (CMS/HCC)    • Chronic deep vein thrombosis (DVT) (CMS/HCC)    • GERD (gastroesophageal reflux disease)        Past Surgical History:   Procedure Laterality Date   • APPENDECTOMY     • EAR TUBES           Current Outpatient Medications:   •  dexlansoprazole (DEXILANT) 60 MG capsule, Take 1 capsule by mouth Daily., Disp: 30 capsule, Rfl: 2  •  divalproex (DEPAKOTE) 500 MG 24 hr tablet, Take 1,500 mg by mouth Daily., Disp: , Rfl: 0  •  ELIQUIS 5 MG tablet tablet, TAKE 1 TABLET TWICE A DAY, Disp: 60 tablet, Rfl: 8  •  escitalopram (LEXAPRO) 10 MG tablet, Take 10 mg by mouth Daily., Disp: , Rfl: 3  •  fluticasone (FLONASE) 50 MCG/ACT nasal spray, FLONASE 50 MCG/ACT NASAL SUSPENSION, Disp: , Rfl:   •  levothyroxine (SYNTHROID, LEVOTHROID) 25 MCG tablet, Take 1 tablet by mouth Daily., Disp: 90 tablet, Rfl: 0  •  lithium (ESKALITH) 450 MG CR tablet, Take 450 mg by mouth 2 (Two) Times a Day., Disp: , Rfl: 1  •  lithium 300 MG tablet, Take 300 mg by mouth Daily., Disp: , Rfl: 3    Allergies   Allergen Reactions   • Aripiprazole Rash and Swelling   • Cephalexin Rash   • Penicillins Rash   • Risperidone Unknown (See Comments)       Family History   Problem Relation Age of Onset   • Colon cancer Maternal Grandmother 55   • Ovarian cancer Maternal  Grandmother    • Basal cell carcinoma Maternal Grandmother         skin   • Kidney cancer Maternal Grandfather 51   • Leukemia Other    • Ovarian cancer Other    • Melanoma Other        Cancer-related family history includes Colon cancer (age of onset: 55) in his maternal grandmother; Kidney cancer (age of onset: 51) in his maternal grandfather; Melanoma in an other family member; Ovarian cancer in his maternal grandmother and another family member.    Social History     Tobacco Use   • Smoking status: Former Smoker     Last attempt to quit: 10/3/2017     Years since quittin.0   • Smokeless tobacco: Never Used   Substance Use Topics   • Alcohol use: No     Frequency: Never   • Drug use: No       I have reviewed the history of present illness, past medical history, family history, social history, lab results, all notes and other records since the patient was last seen on 2019.    SUBJECTIVE:  Patient is here today for routine follow-up.  He denies any new issues since the last visit.  He denies any bleeding complications.  There are no planned surgeries.  Continues to tolerate Eliquis very well        REVIEW OF SYSTEMS:  Review of Systems   Constitutional: Negative for chills and fever.   HENT: Negative for ear pain, mouth sores, nosebleeds and sore throat.    Eyes: Negative for photophobia and visual disturbance.   Respiratory: Negative for wheezing and stridor.    Cardiovascular: Negative for chest pain and palpitations.   Gastrointestinal: Negative for abdominal pain, diarrhea, nausea and vomiting.   Endocrine: Negative for cold intolerance and heat intolerance.   Genitourinary: Negative for dysuria and hematuria.   Musculoskeletal: Negative for joint swelling and neck stiffness.   Skin: Negative for color change and rash.   Neurological: Negative for seizures and syncope.   Hematological: Negative for adenopathy.        No obvious bleeding   Psychiatric/Behavioral: Negative for agitation, confusion and  "hallucinations.       OBJECTIVE:    Vitals:    10/03/19 1200   BP: 108/70   Pulse: 74   Resp: 18   Temp: 98.1 °F (36.7 °C)   Weight: 113 kg (248 lb 3.2 oz)   Height: 175.3 cm (69\")   PainSc: 0-No pain       ECOG  (0) Fully active, able to carry on all predisease performance without restriction    Physical Exam   Constitutional: He is oriented to person, place, and time. No distress.   HENT:   Head: Normocephalic and atraumatic.   Eyes: Conjunctivae and EOM are normal. Right eye exhibits no discharge. Left eye exhibits no discharge. No scleral icterus.   Neck: Normal range of motion. Neck supple. No thyromegaly present.   Cardiovascular: Normal rate, regular rhythm and normal heart sounds. Exam reveals no gallop and no friction rub.   Pulmonary/Chest: Effort normal. No stridor. No respiratory distress. He has no wheezes.   Abdominal: Soft. Bowel sounds are normal. He exhibits no mass. There is no tenderness. There is no rebound and no guarding.   Musculoskeletal: Normal range of motion. He exhibits no tenderness.   Lymphadenopathy:     He has no cervical adenopathy.   Neurological: He is alert and oriented to person, place, and time. He exhibits normal muscle tone.   Skin: Skin is warm. No rash noted. He is not diaphoretic. No erythema.   Psychiatric: He has a normal mood and affect. His behavior is normal.   Nursing note and vitals reviewed.      RECENT LABS  WBC   Date Value Ref Range Status   10/03/2019 7.19 3.40 - 10.80 10*3/mm3 Final     RBC   Date Value Ref Range Status   10/03/2019 4.76 4.14 - 5.80 10*6/mm3 Final     Hemoglobin   Date Value Ref Range Status   10/03/2019 14.6 13.0 - 17.7 g/dL Final     Hematocrit   Date Value Ref Range Status   10/03/2019 42.7 37.5 - 51.0 % Final     MCV   Date Value Ref Range Status   10/03/2019 89.7 79.0 - 97.0 fL Final     MCH   Date Value Ref Range Status   10/03/2019 30.7 26.6 - 33.0 pg Final     MCHC   Date Value Ref Range Status   10/03/2019 34.2 31.5 - 35.7 g/dL Final "     RDW   Date Value Ref Range Status   10/03/2019 13.2 12.3 - 15.4 % Final     RDW-SD   Date Value Ref Range Status   10/03/2019 42.5 37.0 - 54.0 fl Final     MPV   Date Value Ref Range Status   10/03/2019 12.4 (H) 6.0 - 12.0 fL Final     Platelets   Date Value Ref Range Status   10/03/2019 236 140 - 450 10*3/mm3 Final     Neutrophil %   Date Value Ref Range Status   10/03/2019 56.0 42.7 - 76.0 % Final     Lymphocyte %   Date Value Ref Range Status   10/03/2019 31.6 19.6 - 45.3 % Final     Monocyte %   Date Value Ref Range Status   10/03/2019 7.5 5.0 - 12.0 % Final     Eosinophil %   Date Value Ref Range Status   10/03/2019 4.2 0.3 - 6.2 % Final     Basophil %   Date Value Ref Range Status   10/03/2019 0.7 0.0 - 1.5 % Final     Neutrophils, Absolute   Date Value Ref Range Status   10/03/2019 4.03 1.70 - 7.00 10*3/mm3 Final     Lymphocytes, Absolute   Date Value Ref Range Status   10/03/2019 2.27 0.70 - 3.10 10*3/mm3 Final     Monocytes, Absolute   Date Value Ref Range Status   10/03/2019 0.54 0.10 - 0.90 10*3/mm3 Final     Eosinophils, Absolute   Date Value Ref Range Status   10/03/2019 0.30 0.00 - 0.40 10*3/mm3 Final     Basophils, Absolute   Date Value Ref Range Status   10/03/2019 0.05 0.00 - 0.20 10*3/mm3 Final     nRBC   Date Value Ref Range Status   01/14/2019 0 0 /100[WBCs] Final       Lab Results   Component Value Date    GLUCOSE 95 03/28/2019    BUN 12 03/28/2019    CREATININE 0.8 03/28/2019    BCR 15.0 03/28/2019    K 4.0 03/28/2019    CO2 25 03/28/2019    CALCIUM 9.6 03/28/2019    ALBUMIN 4.5 03/28/2019    LABIL2 1.3 03/28/2019    AST 18 03/28/2019    ALT 20 03/28/2019         Assessment/Plan     There are no diagnoses linked to this encounter.    ASSESSMENT:    1. Chronic deep venous thrombosis involving the left lower extremity.    2. History of positive lupus anticoagulant.   3. On Eliquis.   4. Patient has strong family history of cancer on the maternal side of the family.  His mother’s comprehensive  gene test was negative.  Patient does not need genetic testing at this time unless there is family history of cancer on the paternal side.     PLANS:     Patient will continue Eliquis.  A CMP will be drawn today.  I will see him again in six months.  He will let me know should he be scheduled for any surgical procedures in the near future.  Signs and symptoms of bleeding were discussed with patient in great detail         I have reviewed labs results, imaging, vitals, and medications with the patient today. Will follow up in 6 months with me.     I counselled Jesus of the risks of continuing to use tobacco and cessation.    During this visit, I spent 3-10 minutes counseling the patient regarding tobacco cessation.     Patient verbalized understanding and is in agreement of the above plan.      Much of the above report is an electronic transcription/translation of the spoken language to printed text using Dragon Software. As such, the subtleties and finesse of the spoken language may permit erroneous, or at times, nonsensical words or phrases to be inadvertently transcribed; thus changes may be made at a later date to rectify these errors.

## 2019-12-03 RX ORDER — DEXLANSOPRAZOLE 60 MG/1
60 CAPSULE, DELAYED RELEASE ORAL DAILY
Qty: 30 CAPSULE | Refills: 2 | Status: SHIPPED | OUTPATIENT
Start: 2019-12-03

## 2020-03-31 ENCOUNTER — TELEPHONE (OUTPATIENT)
Dept: ONCOLOGY | Facility: CLINIC | Age: 40
End: 2020-03-31

## 2020-04-02 ENCOUNTER — APPOINTMENT (OUTPATIENT)
Dept: LAB | Facility: HOSPITAL | Age: 40
End: 2020-04-02

## 2020-06-01 ENCOUNTER — TELEPHONE (OUTPATIENT)
Dept: ONCOLOGY | Facility: CLINIC | Age: 40
End: 2020-06-01

## 2020-06-01 NOTE — TELEPHONE ENCOUNTER
Patient doesn't have [facility] my chart not able to do video visit for tomorrows appt       Call patient back at [phone]

## 2020-06-02 ENCOUNTER — TELEMEDICINE (OUTPATIENT)
Dept: ONCOLOGY | Facility: CLINIC | Age: 40
End: 2020-06-02

## 2020-06-02 ENCOUNTER — TELEPHONE (OUTPATIENT)
Dept: ONCOLOGY | Facility: CLINIC | Age: 40
End: 2020-06-02

## 2020-06-02 ENCOUNTER — APPOINTMENT (OUTPATIENT)
Dept: LAB | Facility: HOSPITAL | Age: 40
End: 2020-06-02

## 2020-06-02 DIAGNOSIS — D68.59 THROMBOPHILIA (HCC): Primary | ICD-10-CM

## 2020-06-02 PROCEDURE — 99213 OFFICE O/P EST LOW 20 MIN: CPT | Performed by: INTERNAL MEDICINE

## 2020-06-02 NOTE — PROGRESS NOTES
Hematology/Oncology Outpatient Follow Up    PATIENT NAME:Jesus Martínez  :1980  MRN: [id number]  PRIMARY CARE PHYSICIAN: Casimiro Adams MD  REFERRING PHYSICIAN: Casimiro Adams,*    Chief Complaint   Patient presents with   • Follow-up     thrombophilia        HISTORY OF PRESENT ILLNESS:     This is a 38-year-old male who developed DVT involving the left lower extremity back in .  At the time patient was seen by Dr. Harding, was diagnosed with positive lupus anticoagulant and has since then been on Eliquis.  He has had chronic pain, swelling involving the left lower extremity.  He has had several Doppler studies done to rule out an acute DVT.  The first Doppler done in 2015 revealed patient had thrombosis in the popliteal vein extending into the posterior tibial veins on the left side.  On 16 due to symptoms of pain and swelling, he had a repeat Doppler study which showed findings of chronic venous thrombosis of the left lower extremity deep venous system involving the left superficial femoral vein and popliteal vein with recanalization and formation of collaterals in the thigh.  Most recently on 16 patient was seen in the emergency room for chronic pain, swelling involving the left lower extremity.  Doppler done showed chronic deep venous thrombosis of the left superficial femoral vein and popliteal vein.  Patient is here today to have further discussions why his clot has not resolved if the Eliquis is working.  He denies chest pain, shortness of breath.  He has no prior history of thrombosis elsewhere in the past.  He wa accompanied by his mother for the appointment on 16.     • Since his last visit I have had more records released from Dr. Harding’s office.  On 12/1/15 apparently patient had hypercoagulable work up which was negative for protein C, factor V Leiden, prothrombin gene mutation, anticardiolipin antibodies and beta-2 glycoprotein antibody, but  positive for lupus anticoagulant.  He had a repeat lupus anticoagulant on 3/16/16 which was negative.    • 12/13/16 - Negative mixing study for inhibitor effect in PTT.  Lupus inhibitor was not identified.    • 2/28/28 - Doppler ultrasound of the left lower extremity due to complaint of swelling.  This came back negative for acute DVT, but he does have chronic DVT in the left femoral vein.    • Patient’s comprehensive genetic analysis with [employer] was negative for any significant mutation in his mother.   • 7/3/18 - CMP:  BUN 10.  Creatinine 1.  Liver function tests, normal.   • Patient has no specific complaints today.  He remains on anticoagulation therapy and has no specific issues.  There are no planned surgeries    Past Medical History:   Diagnosis Date   • Anxiety    • Bipolar disorder (manic depression) (CMS/HCC)    • Chronic deep vein thrombosis (DVT) (CMS/HCC)    • GERD (gastroesophageal reflux disease)        Past Surgical History:   Procedure Laterality Date   • APPENDECTOMY     • EAR TUBES           Current Outpatient Medications:   •  dexlansoprazole (DEXILANT) 60 MG capsule, Take 1 capsule by mouth Daily., Disp: 30 capsule, Rfl: 2  •  divalproex (DEPAKOTE) 500 MG 24 hr tablet, Take 1,500 mg by mouth Daily., Disp: , Rfl: 0  •  ELIQUIS 5 MG tablet tablet, TAKE 1 TABLET TWICE A DAY, Disp: 60 tablet, Rfl: 8  •  escitalopram (LEXAPRO) 10 MG tablet, Take 10 mg by mouth Daily., Disp: , Rfl: 3  •  fluticasone (FLONASE) 50 MCG/ACT nasal spray, FLONASE 50 MCG/ACT NASAL SUSPENSION, Disp: , Rfl:   •  levothyroxine (SYNTHROID, LEVOTHROID) 25 MCG tablet, Take 1 tablet by mouth Daily., Disp: 90 tablet, Rfl: 0  •  lithium (ESKALITH) 450 MG CR tablet, Take 450 mg by mouth 2 (Two) Times a Day., Disp: , Rfl: 1  •  lithium 300 MG tablet, Take 300 mg by mouth Daily., Disp: , Rfl: 3    Allergies   Allergen Reactions   • Aripiprazole Rash and Swelling   • Cephalexin Rash   • Penicillins Rash   • Risperidone  Unknown (See Comments)       Family History   Problem Relation Age of Onset   • Colon cancer Maternal Grandmother 55   • Ovarian cancer Maternal Grandmother    • Basal cell carcinoma Maternal Grandmother         skin   • Kidney cancer Maternal Grandfather 51   • Leukemia Other    • Ovarian cancer Other    • Melanoma Other        Cancer-related family history includes Colon cancer (age of onset: 55) in his maternal grandmother; Kidney cancer (age of onset: 51) in his maternal grandfather; Melanoma in an other family member; Ovarian cancer in his maternal grandmother and another family member.    Social History     Tobacco Use   • Smoking status: Former Smoker     Last attempt to quit: 10/3/2017     Years since quittin.6   • Smokeless tobacco: Never Used   Substance Use Topics   • Alcohol use: No     Frequency: Never   • Drug use: No       I have reviewed and confirmed the accuracy of the patient's history: Chief complaint, HPI and ROS as entered by the MA/LPN/RN. Earline Bowman MD 20       SUBJECTIVE:    Patient is here today for routine follow-up.  He denies any new issues since the last visit.  He denies any bleeding complications.  There are no planned surgeries.  Continues to tolerate Eliquis very well        REVIEW OF SYSTEMS:  Review of Systems   Constitutional: Negative for chills and fever.   HENT: Negative for ear pain, mouth sores, nosebleeds and sore throat.    Eyes: Negative for photophobia and visual disturbance.   Respiratory: Negative for wheezing and stridor.    Cardiovascular: Negative for chest pain and palpitations.   Gastrointestinal: Negative for abdominal pain, diarrhea, nausea and vomiting.   Endocrine: Negative for cold intolerance and heat intolerance.   Genitourinary: Negative for dysuria and hematuria.   Musculoskeletal: Negative for joint swelling and neck stiffness.   Skin: Negative for color change and rash.   Neurological: Negative for seizures and syncope.    Hematological: Negative for adenopathy.        No obvious bleeding   Psychiatric/Behavioral: Negative for agitation, confusion and hallucinations.       OBJECTIVE:    There were no vitals filed for this visit.    ECOG  (0) Fully active, able to carry on all predisease performance without restriction    Physical Exam   Constitutional: He is oriented to person, place, and time. No distress.   HENT:   Head: Normocephalic and atraumatic.   Eyes: Conjunctivae and EOM are normal. Right eye exhibits no discharge. Left eye exhibits no discharge. No scleral icterus.   Neck: Normal range of motion. Neck supple. No thyromegaly present.   Pulmonary/Chest: Effort normal. No stridor. No respiratory distress. He has no wheezes.   Musculoskeletal: Normal range of motion. He exhibits no tenderness.   Lymphadenopathy:     He has no cervical adenopathy.   Neurological: He is alert and oriented to person, place, and time. He exhibits normal muscle tone.   Skin: Skin is warm. No rash noted. He is not diaphoretic. No erythema.   Psychiatric: He has a normal mood and affect. His behavior is normal.   Nursing note and vitals reviewed.      RECENT LABS  WBC   Date Value Ref Range Status   10/03/2019 7.19 3.40 - 10.80 10*3/mm3 Final     RBC   Date Value Ref Range Status   10/03/2019 4.76 4.14 - 5.80 10*6/mm3 Final     Hemoglobin   Date Value Ref Range Status   10/03/2019 14.6 13.0 - 17.7 g/dL Final     Hematocrit   Date Value Ref Range Status   10/03/2019 42.7 37.5 - 51.0 % Final     MCV   Date Value Ref Range Status   10/03/2019 89.7 79.0 - 97.0 fL Final     MCH   Date Value Ref Range Status   10/03/2019 30.7 26.6 - 33.0 pg Final     MCHC   Date Value Ref Range Status   10/03/2019 34.2 31.5 - 35.7 g/dL Final     RDW   Date Value Ref Range Status   10/03/2019 13.2 12.3 - 15.4 % Final     RDW-SD   Date Value Ref Range Status   10/03/2019 42.5 37.0 - 54.0 fl Final     MPV   Date Value Ref Range Status   10/03/2019 12.4 (H) 6.0 - 12.0 fL  Final     Platelets   Date Value Ref Range Status   10/03/2019 236 140 - 450 10*3/mm3 Final     Neutrophil %   Date Value Ref Range Status   10/03/2019 56.0 42.7 - 76.0 % Final     Lymphocyte %   Date Value Ref Range Status   10/03/2019 31.6 19.6 - 45.3 % Final     Monocyte %   Date Value Ref Range Status   10/03/2019 7.5 5.0 - 12.0 % Final     Eosinophil %   Date Value Ref Range Status   10/03/2019 4.2 0.3 - 6.2 % Final     Basophil %   Date Value Ref Range Status   10/03/2019 0.7 0.0 - 1.5 % Final     Neutrophils, Absolute   Date Value Ref Range Status   10/03/2019 4.03 1.70 - 7.00 10*3/mm3 Final     Lymphocytes, Absolute   Date Value Ref Range Status   10/03/2019 2.27 0.70 - 3.10 10*3/mm3 Final     Monocytes, Absolute   Date Value Ref Range Status   10/03/2019 0.54 0.10 - 0.90 10*3/mm3 Final     Eosinophils, Absolute   Date Value Ref Range Status   10/03/2019 0.30 0.00 - 0.40 10*3/mm3 Final     Basophils, Absolute   Date Value Ref Range Status   10/03/2019 0.05 0.00 - 0.20 10*3/mm3 Final     nRBC   Date Value Ref Range Status   01/14/2019 0 0 /100[WBCs] Final       Lab Results   Component Value Date    GLUCOSE 104 (H) 10/03/2019    BUN 7 (L) 10/03/2019    CREATININE 1.10 10/03/2019    EGFRIFNONA 76 10/03/2019    BCR 6.4 10/03/2019    K 3.9 10/03/2019    CO2 25.0 10/03/2019    CALCIUM 9.0 10/03/2019    ALBUMIN 3.90 10/03/2019    LABIL2 1.3 03/28/2019    AST 26 10/03/2019    ALT 28 10/03/2019         Assessment/Plan     Thrombophilia (CMS/HCC)  - CBC & Differential  - Comprehensive Metabolic Panel      ASSESSMENT:    1. Chronic deep venous thrombosis involving the left lower extremity.    2. History of positive lupus anticoagulant.   3. On Eliquis.   4. Patient has strong family history of cancer on the maternal side of the family.  His mother’s comprehensive gene test was negative.  Patient does not need genetic testing at this time unless there is family history of cancer on the paternal side.     PLANS:      Patient will continue Eliquis.    A CMP and CBC will be drawn.  Discussed de-escalating anticoagulation therapy.   Will need labs including beta-2 glycoprotein, lupus anticoagulant, anticardiolipin antibodies and d-dimer.     I will see him again in 4 months.    He will let me know should he be scheduled for any surgical procedures in the near future.    Signs and symptoms of bleeding were discussed with patient in great detail         I have reviewed labs results, imaging, vitals, and medications with the patient today. Will follow up in 4 months with me.      Patient verbalized understanding and is in agreement of the above plan.    I spent more than 20 minutes discussing with patient management recommendations, reviewing imaging studies, lab results, progress notes and formulating management decisions.  Time was also spent answering all his/ her questions to the best of my abilities.

## 2020-06-02 NOTE — TELEPHONE ENCOUNTER
Dr. Bowman has ordered for the patient to have labs done now (they have been ordered) and to follow up in 4 months.  I called to schedule the appointment and left a message requesting that the patient call the office to schedule the appointments.

## 2020-06-16 ENCOUNTER — LAB (OUTPATIENT)
Dept: LAB | Facility: HOSPITAL | Age: 40
End: 2020-06-16

## 2020-06-16 ENCOUNTER — OFFICE VISIT (OUTPATIENT)
Dept: ONCOLOGY | Facility: CLINIC | Age: 40
End: 2020-06-16

## 2020-06-16 VITALS
DIASTOLIC BLOOD PRESSURE: 84 MMHG | RESPIRATION RATE: 18 BRPM | HEIGHT: 69 IN | TEMPERATURE: 97.4 F | HEART RATE: 92 BPM | BODY MASS INDEX: 35.4 KG/M2 | WEIGHT: 239 LBS | SYSTOLIC BLOOD PRESSURE: 132 MMHG

## 2020-06-16 DIAGNOSIS — D68.59 THROMBOPHILIA (HCC): ICD-10-CM

## 2020-06-16 DIAGNOSIS — D68.59 THROMBOPHILIA (HCC): Primary | ICD-10-CM

## 2020-06-16 LAB
ALBUMIN SERPL-MCNC: 4.8 G/DL (ref 3.5–5.2)
ALBUMIN/GLOB SERPL: 1.5 G/DL
ALP SERPL-CCNC: 74 U/L (ref 39–117)
ALT SERPL W P-5'-P-CCNC: 19 U/L (ref 1–41)
ANION GAP SERPL CALCULATED.3IONS-SCNC: 9 MMOL/L (ref 5–15)
AST SERPL-CCNC: 14 U/L (ref 1–40)
BASOPHILS # BLD AUTO: 0.04 10*3/MM3 (ref 0–0.2)
BASOPHILS NFR BLD AUTO: 0.2 % (ref 0–1.5)
BILIRUB SERPL-MCNC: 0.8 MG/DL (ref 0.2–1.2)
BUN BLD-MCNC: 9 MG/DL (ref 6–20)
BUN BLD-MCNC: ABNORMAL MG/DL
BUN/CREAT SERPL: ABNORMAL
CALCIUM SPEC-SCNC: 9.5 MG/DL (ref 8.6–10.5)
CHLORIDE SERPL-SCNC: 102 MMOL/L (ref 98–107)
CO2 SERPL-SCNC: 26 MMOL/L (ref 22–29)
CREAT BLD-MCNC: 0.98 MG/DL (ref 0.76–1.27)
D DIMER PPP FEU-MCNC: <0.17 MCGFEU/ML (ref 0.17–0.59)
DEPRECATED RDW RBC AUTO: 46.4 FL (ref 37–54)
EOSINOPHIL # BLD AUTO: 0.26 10*3/MM3 (ref 0–0.4)
EOSINOPHIL NFR BLD AUTO: 1.6 % (ref 0.3–6.2)
ERYTHROCYTE [DISTWIDTH] IN BLOOD BY AUTOMATED COUNT: 13.7 % (ref 12.3–15.4)
GFR SERPL CREATININE-BSD FRML MDRD: 86 ML/MIN/1.73
GLOBULIN UR ELPH-MCNC: 3.1 GM/DL
GLUCOSE BLD-MCNC: 129 MG/DL (ref 65–99)
HCT VFR BLD AUTO: 45.3 % (ref 37.5–51)
HGB BLD-MCNC: 14.8 G/DL (ref 13–17.7)
LYMPHOCYTES # BLD AUTO: 2.22 10*3/MM3 (ref 0.7–3.1)
LYMPHOCYTES NFR BLD AUTO: 13.6 % (ref 19.6–45.3)
MCH RBC QN AUTO: 31.1 PG (ref 26.6–33)
MCHC RBC AUTO-ENTMCNC: 32.7 G/DL (ref 31.5–35.7)
MCV RBC AUTO: 95.2 FL (ref 79–97)
MONOCYTES # BLD AUTO: 0.52 10*3/MM3 (ref 0.1–0.9)
MONOCYTES NFR BLD AUTO: 3.2 % (ref 5–12)
NEUTROPHILS # BLD AUTO: 13.25 10*3/MM3 (ref 1.7–7)
NEUTROPHILS NFR BLD AUTO: 81.4 % (ref 42.7–76)
PLATELET # BLD AUTO: 287 10*3/MM3 (ref 140–450)
PMV BLD AUTO: 12.4 FL (ref 6–12)
POTASSIUM BLD-SCNC: 4.3 MMOL/L (ref 3.5–5.2)
PROT SERPL-MCNC: 7.9 G/DL (ref 6–8.5)
RBC # BLD AUTO: 4.76 10*6/MM3 (ref 4.14–5.8)
SODIUM BLD-SCNC: 137 MMOL/L (ref 136–145)
WBC NRBC COR # BLD: 16.29 10*3/MM3 (ref 3.4–10.8)

## 2020-06-16 PROCEDURE — 99213 OFFICE O/P EST LOW 20 MIN: CPT | Performed by: INTERNAL MEDICINE

## 2020-06-16 PROCEDURE — 85025 COMPLETE CBC W/AUTO DIFF WBC: CPT

## 2020-06-16 PROCEDURE — 80053 COMPREHEN METABOLIC PANEL: CPT

## 2020-06-16 PROCEDURE — 36415 COLL VENOUS BLD VENIPUNCTURE: CPT

## 2020-06-16 PROCEDURE — 85379 FIBRIN DEGRADATION QUANT: CPT

## 2020-06-16 NOTE — PROGRESS NOTES
Hematology/Oncology Outpatient Follow Up    PATIENT NAME:Jesus Martínez  :1980  MRN: [id number]  PRIMARY CARE PHYSICIAN: Casimiro Adams MD  REFERRING PHYSICIAN: Casimiro Adams,*    Chief Complaint   Patient presents with   • Follow-up     Thrombophilia        HISTORY OF PRESENT ILLNESS:     This is a 38-year-old male who developed DVT involving the left lower extremity back in .  At the time patient was seen by Dr. Harding, was diagnosed with positive lupus anticoagulant and has since then been on Eliquis.  He has had chronic pain, swelling involving the left lower extremity.  He has had several Doppler studies done to rule out an acute DVT.  The first Doppler done in 2015 revealed patient had thrombosis in the popliteal vein extending into the posterior tibial veins on the left side.  On 16 due to symptoms of pain and swelling, he had a repeat Doppler study which showed findings of chronic venous thrombosis of the left lower extremity deep venous system involving the left superficial femoral vein and popliteal vein with recanalization and formation of collaterals in the thigh.  Most recently on 16 patient was seen in the emergency room for chronic pain, swelling involving the left lower extremity.  Doppler done showed chronic deep venous thrombosis of the left superficial femoral vein and popliteal vein.  Patient is here today to have further discussions why his clot has not resolved if the Eliquis is working.  He denies chest pain, shortness of breath.  He has no prior history of thrombosis elsewhere in the past.  He wa accompanied by his mother for the appointment on 16.     • Since his last visit I have had more records released from Dr. Harding’s office.  On 12/1/15 apparently patient had hypercoagulable work up which was negative for protein C, factor V Leiden, prothrombin gene mutation, anticardiolipin antibodies and beta-2 glycoprotein antibody, but  positive for lupus anticoagulant.  He had a repeat lupus anticoagulant on 3/16/16 which was negative.    • 12/13/16 - Negative mixing study for inhibitor effect in PTT.  Lupus inhibitor was not identified.    • 2/28/28 - Doppler ultrasound of the left lower extremity due to complaint of swelling.  This came back negative for acute DVT, but he does have chronic DVT in the left femoral vein.    • Patient’s comprehensive genetic analysis with [employer] was negative for any significant mutation in his mother.   • 7/3/18 - CMP:  BUN 10.  Creatinine 1.  Liver function tests, normal.   • Patient has no new issues today.  He is here for routine visit.  He also had his labs done today    Past Medical History:   Diagnosis Date   • Anxiety    • Bipolar disorder (manic depression) (CMS/HCC)    • Chronic deep vein thrombosis (DVT) (CMS/HCC)    • GERD (gastroesophageal reflux disease)        Past Surgical History:   Procedure Laterality Date   • APPENDECTOMY     • EAR TUBES           Current Outpatient Medications:   •  dexlansoprazole (DEXILANT) 60 MG capsule, Take 1 capsule by mouth Daily., Disp: 30 capsule, Rfl: 2  •  divalproex (DEPAKOTE) 500 MG 24 hr tablet, Take 1,500 mg by mouth Daily., Disp: , Rfl: 0  •  ELIQUIS 5 MG tablet tablet, TAKE 1 TABLET TWICE A DAY, Disp: 60 tablet, Rfl: 8  •  escitalopram (LEXAPRO) 10 MG tablet, Take 10 mg by mouth Daily., Disp: , Rfl: 3  •  fluticasone (FLONASE) 50 MCG/ACT nasal spray, FLONASE 50 MCG/ACT NASAL SUSPENSION, Disp: , Rfl:   •  levothyroxine (SYNTHROID, LEVOTHROID) 25 MCG tablet, Take 1 tablet by mouth Daily., Disp: 90 tablet, Rfl: 0  •  lithium (ESKALITH) 450 MG CR tablet, Take 450 mg by mouth 2 (Two) Times a Day., Disp: , Rfl: 1  •  lithium 300 MG tablet, Take 300 mg by mouth Daily., Disp: , Rfl: 3    Allergies   Allergen Reactions   • Aripiprazole Rash and Swelling   • Cephalexin Rash   • Penicillins Rash   • Risperidone Unknown (See Comments)       Family History    Problem Relation Age of Onset   • Colon cancer Maternal Grandmother 55   • Ovarian cancer Maternal Grandmother    • Basal cell carcinoma Maternal Grandmother         skin   • Kidney cancer Maternal Grandfather 51   • Leukemia Other    • Ovarian cancer Other    • Melanoma Other        Cancer-related family history includes Colon cancer (age of onset: 55) in his maternal grandmother; Kidney cancer (age of onset: 51) in his maternal grandfather; Melanoma in an other family member; Ovarian cancer in his maternal grandmother and another family member.    Social History     Tobacco Use   • Smoking status: Former Smoker     Last attempt to quit: 10/3/2017     Years since quittin.7   • Smokeless tobacco: Never Used   Substance Use Topics   • Alcohol use: No     Frequency: Never   • Drug use: No       I have reviewed and confirmed the accuracy of the patient's history: Chief complaint, HPI and ROS as entered by the MA/LPN/RN. Earline Bowman MD 20       SUBJECTIVE:    Patient is here today for routine follow-up.  He denies any new issues.  He denies bleeding problems.  He remains on Eliquis twice a day    REVIEW OF SYSTEMS:  Review of Systems   Constitutional: Negative for chills and fever.   HENT: Negative for ear pain, mouth sores, nosebleeds and sore throat.    Eyes: Negative for photophobia and visual disturbance.   Respiratory: Negative for wheezing and stridor.    Cardiovascular: Negative for chest pain and palpitations.   Gastrointestinal: Negative for abdominal pain, diarrhea, nausea and vomiting.   Endocrine: Negative for cold intolerance and heat intolerance.   Genitourinary: Negative for dysuria and hematuria.   Musculoskeletal: Negative for joint swelling and neck stiffness.   Skin: Negative for color change and rash.   Neurological: Negative for seizures and syncope.   Hematological: Negative for adenopathy.        No obvious bleeding   Psychiatric/Behavioral: Negative for agitation, confusion  "and hallucinations.       OBJECTIVE:    Vitals:    06/16/20 1430   BP: 132/84   Pulse: 92   Resp: 18   Temp: 97.4 °F (36.3 °C)   TempSrc: Oral   Weight: 108 kg (239 lb)   Height: 175.3 cm (69\")   PainSc: 0-No pain       ECOG  (0) Fully active, able to carry on all predisease performance without restriction    Physical Exam   Constitutional: He is oriented to person, place, and time. No distress.   HENT:   Head: Normocephalic and atraumatic.   Eyes: Conjunctivae and EOM are normal. Right eye exhibits no discharge. Left eye exhibits no discharge. No scleral icterus.   Neck: Normal range of motion. Neck supple. No thyromegaly present.   Cardiovascular: Normal rate, regular rhythm and normal heart sounds. Exam reveals no gallop and no friction rub.   Pulmonary/Chest: Effort normal. No stridor. No respiratory distress. He has no wheezes.   Abdominal: Soft. Bowel sounds are normal. He exhibits no mass. There is no tenderness. There is no rebound and no guarding.   Musculoskeletal: Normal range of motion. He exhibits no tenderness.   Lymphadenopathy:     He has no cervical adenopathy.   Neurological: He is alert and oriented to person, place, and time. He exhibits normal muscle tone.   Skin: Skin is warm. No rash noted. He is not diaphoretic. No erythema.   Psychiatric: He has a normal mood and affect. His behavior is normal.   Nursing note and vitals reviewed.      RECENT LABS  WBC   Date Value Ref Range Status   06/16/2020 16.29 (H) 3.40 - 10.80 10*3/mm3 Final     RBC   Date Value Ref Range Status   06/16/2020 4.76 4.14 - 5.80 10*6/mm3 Final     Hemoglobin   Date Value Ref Range Status   06/16/2020 14.8 13.0 - 17.7 g/dL Final     Hematocrit   Date Value Ref Range Status   06/16/2020 45.3 37.5 - 51.0 % Final     MCV   Date Value Ref Range Status   06/16/2020 95.2 79.0 - 97.0 fL Final     MCH   Date Value Ref Range Status   06/16/2020 31.1 26.6 - 33.0 pg Final     MCHC   Date Value Ref Range Status   06/16/2020 32.7 31.5 - " 35.7 g/dL Final     RDW   Date Value Ref Range Status   06/16/2020 13.7 12.3 - 15.4 % Final     RDW-SD   Date Value Ref Range Status   06/16/2020 46.4 37.0 - 54.0 fl Final     MPV   Date Value Ref Range Status   06/16/2020 12.4 (H) 6.0 - 12.0 fL Final     Platelets   Date Value Ref Range Status   06/16/2020 287 140 - 450 10*3/mm3 Final     Neutrophil %   Date Value Ref Range Status   06/16/2020 81.4 (H) 42.7 - 76.0 % Final     Lymphocyte %   Date Value Ref Range Status   06/16/2020 13.6 (L) 19.6 - 45.3 % Final     Monocyte %   Date Value Ref Range Status   06/16/2020 3.2 (L) 5.0 - 12.0 % Final     Eosinophil %   Date Value Ref Range Status   06/16/2020 1.6 0.3 - 6.2 % Final     Basophil %   Date Value Ref Range Status   06/16/2020 0.2 0.0 - 1.5 % Final     Neutrophils, Absolute   Date Value Ref Range Status   06/16/2020 13.25 (H) 1.70 - 7.00 10*3/mm3 Final     Lymphocytes, Absolute   Date Value Ref Range Status   06/16/2020 2.22 0.70 - 3.10 10*3/mm3 Final     Monocytes, Absolute   Date Value Ref Range Status   06/16/2020 0.52 0.10 - 0.90 10*3/mm3 Final     Eosinophils, Absolute   Date Value Ref Range Status   06/16/2020 0.26 0.00 - 0.40 10*3/mm3 Final     Basophils, Absolute   Date Value Ref Range Status   06/16/2020 0.04 0.00 - 0.20 10*3/mm3 Final     nRBC   Date Value Ref Range Status   01/14/2019 0 0 /100[WBCs] Final       Lab Results   Component Value Date    GLUCOSE 129 (H) 06/16/2020    BUN  06/16/2020      Comment:      Testing performed by alternate method    CREATININE 0.98 06/16/2020    EGFRIFNONA 86 06/16/2020    BCR  06/16/2020      Comment:      Testing not performed    K 4.3 06/16/2020    CO2 26.0 06/16/2020    CALCIUM 9.5 06/16/2020    ALBUMIN 4.80 06/16/2020    LABIL2 1.3 03/28/2019    AST 14 06/16/2020    ALT 19 06/16/2020         Assessment/Plan     There are no diagnoses linked to this encounter.    ASSESSMENT:    1. Chronic deep venous thrombosis involving the left lower extremity.    2. History  of positive lupus anticoagulant.  Ongoing management  3. On Eliquis.  Ongoing management  4. Patient has strong family history of cancer on the maternal side of the family.  His mother’s comprehensive gene test was negative.  Patient does not need genetic testing at this time unless there is family history of cancer on the paternal side.     PLANS:     Patient will continue Eliquis.    A CMP and CBC will be drawn.  Discussed de-escalating anticoagulation therapy.   Eduard labs today including beta-2 glycoprotein, lupus anticoagulant, anticardiolipin antibodies and d-dimer.  Use results to decide on de-escalating anticoagulation therapy     I will see him again in 4 months.    He will let me know should he be scheduled for any surgical procedures in the near future.    Signs and symptoms of bleeding were discussed with patient in great detail         I have reviewed labs results, imaging, vitals, and medications with the patient today. Will follow up in 4 months with me.      Patient verbalized understanding and is in agreement of the above plan.

## 2020-06-17 LAB
CARDIOLIPIN IGG SER IA-ACNC: <9 GPL U/ML (ref 0–14)
CARDIOLIPIN IGM SER IA-ACNC: <9 MPL U/ML (ref 0–12)

## 2020-06-18 LAB
B2 GLYCOPROT1 IGA SER-ACNC: <9 GPI IGA UNITS (ref 0–25)
B2 GLYCOPROT1 IGG SER-ACNC: <9 GPI IGG UNITS (ref 0–20)
B2 GLYCOPROT1 IGM SER-ACNC: <9 GPI IGM UNITS (ref 0–32)
DRVVT IMM 1:2 NP PPP: 43.5 SEC (ref 0–47)
LA NT DPL PPP: 44.8 SEC (ref 0–55)
LA NT DPL/LA NT HPL PPP-RTO: 0.86 RATIO (ref 0–1.4)
LA NT PLATELET PPP: 44.5 SEC (ref 0–51.9)
LUPUS ANTICOAGULANT REFLEX: ABNORMAL
SCREEN DRVVT: 64.5 SEC (ref 0–47)
THROMBIN TIME: 20.6 SEC (ref 0–23)

## 2020-10-02 NOTE — PROGRESS NOTES
Hematology/Oncology Outpatient Follow Up    PATIENT NAME:Jesus Martínez  :1980  MRN: [id number]  PRIMARY CARE PHYSICIAN: Casimiro Adams MD  REFERRING PHYSICIAN: Casimiro Adams,*    Chief Complaint   Patient presents with   • Follow-up     thrombophilia        HISTORY OF PRESENT ILLNESS:     This is a 39-year-old male who developed DVT involving the left lower extremity back in .  At the time patient was seen by Dr. Harding, was diagnosed with positive lupus anticoagulant and has since then been on Eliquis.  He has had chronic pain, swelling involving the left lower extremity.  He has had several Doppler studies done to rule out an acute DVT.  The first Doppler done in 2015 revealed patient had thrombosis in the popliteal vein extending into the posterior tibial veins on the left side.  On 16 due to symptoms of pain and swelling, he had a repeat Doppler study which showed findings of chronic venous thrombosis of the left lower extremity deep venous system involving the left superficial femoral vein and popliteal vein with recanalization and formation of collaterals in the thigh.  Most recently on 16 patient was seen in the emergency room for chronic pain, swelling involving the left lower extremity.  Doppler done showed chronic deep venous thrombosis of the left superficial femoral vein and popliteal vein.  Patient is here today to have further discussions why his clot has not resolved if the Eliquis is working.  He denies chest pain, shortness of breath.  He has no prior history of thrombosis elsewhere in the past.  He wa accompanied by his mother for the appointment on 16.     • Since his last visit I have had more records released from Dr. Harding’s office.  On 12/1/15 apparently patient had hypercoagulable work up which was negative for protein C, factor V Leiden, prothrombin gene mutation, anticardiolipin antibodies and beta-2 glycoprotein antibody, but  positive for lupus anticoagulant.  He had a repeat lupus anticoagulant on 3/16/16 which was negative.    • 12/13/16 - Negative mixing study for inhibitor effect in PTT.  Lupus inhibitor was not identified.    • 2/28/28 - Doppler ultrasound of the left lower extremity due to complaint of swelling.  This came back negative for acute DVT, but he does have chronic DVT in the left femoral vein.    • Patient’s comprehensive genetic analysis with [employer] was negative for any significant mutation in his mother.   • 7/3/18 - CMP:  BUN 10.  Creatinine 1.  Liver function tests, normal.   • 6/6/2020: Anticardiolipin antibodies normal, beta-2 glycoprotein antibodies normal, lupus anticoagulation not detected, but dRVVT 64.5.     HPI, ROS and PFSH have been reviewed and confirmed on 10/5/2020.     Past Medical History:   Diagnosis Date   • Anxiety    • Bipolar disorder (manic depression) (CMS/HCC)    • Chronic deep vein thrombosis (DVT) (CMS/HCC)    • GERD (gastroesophageal reflux disease)        Past Surgical History:   Procedure Laterality Date   • APPENDECTOMY     • EAR TUBES           Current Outpatient Medications:   •  dexlansoprazole (DEXILANT) 60 MG capsule, Take 1 capsule by mouth Daily., Disp: 30 capsule, Rfl: 2  •  divalproex (DEPAKOTE) 500 MG 24 hr tablet, Take 1,500 mg by mouth Daily., Disp: , Rfl: 0  •  escitalopram (LEXAPRO) 10 MG tablet, Take 10 mg by mouth Daily., Disp: , Rfl: 3  •  fluticasone (FLONASE) 50 MCG/ACT nasal spray, FLONASE 50 MCG/ACT NASAL SUSPENSION, Disp: , Rfl:   •  levothyroxine (SYNTHROID, LEVOTHROID) 25 MCG tablet, Take 1 tablet by mouth Daily., Disp: 90 tablet, Rfl: 0  •  lithium (ESKALITH) 450 MG CR tablet, Take 450 mg by mouth 2 (Two) Times a Day., Disp: , Rfl: 1  •  lithium 300 MG tablet, Take 300 mg by mouth Daily., Disp: , Rfl: 3  •  Loratadine 10 MG capsule, Take  by mouth., Disp: , Rfl:   •  apixaban (ELIQUIS) 2.5 MG tablet tablet, Take 1 tablet by mouth Every 12 (Twelve)  Hours., Disp: 60 tablet, Rfl: 2    Allergies   Allergen Reactions   • Aripiprazole Rash and Swelling   • Cephalexin Rash   • Penicillins Rash   • Risperidone Unknown (See Comments)       Family History   Problem Relation Age of Onset   • Colon cancer Maternal Grandmother 55   • Ovarian cancer Maternal Grandmother    • Basal cell carcinoma Maternal Grandmother         skin   • Kidney cancer Maternal Grandfather 51   • Leukemia Other    • Ovarian cancer Other    • Melanoma Other        Cancer-related family history includes Colon cancer (age of onset: 55) in his maternal grandmother; Kidney cancer (age of onset: 51) in his maternal grandfather; Melanoma in an other family member; Ovarian cancer in his maternal grandmother and another family member.    Social History     Tobacco Use   • Smoking status: Former Smoker     Quit date: 10/3/2017     Years since quitting: 3.0   • Smokeless tobacco: Never Used   Substance Use Topics   • Alcohol use: No     Frequency: Never   • Drug use: No     HPI, ROS and PFSH have been reviewed and confirmed on 10/5/2020.     SUBJECTIVE:  Patient presents to the clinic for follow-up appointment.  He denies new issues or changes to his health care.  He denies any bleeding problems.  He remains on Eliquis 5 mg p.o. twice a day.    REVIEW OF SYSTEMS:  Review of Systems   Constitutional: Negative for chills, fatigue and fever.   HENT: Negative for mouth sores and nosebleeds.    Respiratory: Negative for chest tightness and shortness of breath.    Cardiovascular: Negative for chest pain and leg swelling.   Gastrointestinal: Negative for blood in stool, diarrhea, nausea and vomiting.   Genitourinary: Negative for hematuria.   Musculoskeletal: Negative for arthralgias and myalgias.   Skin: Negative for rash and wound.   Neurological: Negative for dizziness and headaches.   Hematological: Does not bruise/bleed easily.   Psychiatric/Behavioral: Negative for confusion.     OBJECTIVE:    Vitals:     "10/05/20 1401   BP: 113/78   Pulse: 86   Resp: 20   Temp: 97.7 °F (36.5 °C)   TempSrc: Temporal   Weight: 118 kg (260 lb)   Height: 175.3 cm (69\")   PainSc: 0-No pain       ECOG  (0) Fully active, able to carry on all predisease performance without restriction    Physical Exam   Constitutional: He is oriented to person, place, and time.  Non-toxic appearance. He does not appear ill. No distress.   HENT:   Head: Normocephalic and atraumatic.   Eyes: Conjunctivae are normal. Right eye exhibits no discharge. No scleral icterus.   Neck: Normal range of motion. No muscular tenderness present. No neck rigidity.   Cardiovascular: Normal rate, regular rhythm and normal heart sounds.   Pulmonary/Chest: Effort normal and breath sounds normal. No respiratory distress. He has no wheezes.   Abdominal: Soft. Normal appearance and bowel sounds are normal. He exhibits no distension. There is no abdominal tenderness.   Musculoskeletal: Normal range of motion.      Comments: Mild left lower extremity swelling, no erythema noted, non-pitting   Lymphadenopathy:     He has no cervical adenopathy.   Neurological: He is alert and oriented to person, place, and time.   Skin: Skin is warm. No bruising noted. He is not diaphoretic. No jaundice.   Psychiatric: His behavior is normal. Mood normal.   Nursing note and vitals reviewed.    RECENT LABS  WBC   Date Value Ref Range Status   10/05/2020 10.27 3.40 - 10.80 10*3/mm3 Final     RBC   Date Value Ref Range Status   10/05/2020 4.78 4.14 - 5.80 10*6/mm3 Final     Hemoglobin   Date Value Ref Range Status   10/05/2020 14.6 13.0 - 17.7 g/dL Final     Hematocrit   Date Value Ref Range Status   10/05/2020 44.3 37.5 - 51.0 % Final     MCV   Date Value Ref Range Status   10/05/2020 92.7 79.0 - 97.0 fL Final     MCH   Date Value Ref Range Status   10/05/2020 30.5 26.6 - 33.0 pg Final     MCHC   Date Value Ref Range Status   10/05/2020 33.0 31.5 - 35.7 g/dL Final     RDW   Date Value Ref Range Status "   10/05/2020 12.3 12.3 - 15.4 % Final     RDW-SD   Date Value Ref Range Status   10/05/2020 41.1 37.0 - 54.0 fl Final     MPV   Date Value Ref Range Status   10/05/2020 11.9 6.0 - 12.0 fL Final     Platelets   Date Value Ref Range Status   10/05/2020 271 140 - 450 10*3/mm3 Final     Neutrophil %   Date Value Ref Range Status   10/05/2020 69.1 42.7 - 76.0 % Final     Lymphocyte %   Date Value Ref Range Status   10/05/2020 21.5 19.6 - 45.3 % Final     Monocyte %   Date Value Ref Range Status   10/05/2020 6.5 5.0 - 12.0 % Final     Eosinophil %   Date Value Ref Range Status   10/05/2020 2.3 0.3 - 6.2 % Final     Basophil %   Date Value Ref Range Status   10/05/2020 0.6 0.0 - 1.5 % Final     Neutrophils, Absolute   Date Value Ref Range Status   10/05/2020 7.09 (H) 1.70 - 7.00 10*3/mm3 Final     Lymphocytes, Absolute   Date Value Ref Range Status   10/05/2020 2.21 0.70 - 3.10 10*3/mm3 Final     Monocytes, Absolute   Date Value Ref Range Status   10/05/2020 0.67 0.10 - 0.90 10*3/mm3 Final     Eosinophils, Absolute   Date Value Ref Range Status   10/05/2020 0.24 0.00 - 0.40 10*3/mm3 Final     Basophils, Absolute   Date Value Ref Range Status   10/05/2020 0.06 0.00 - 0.20 10*3/mm3 Final     nRBC   Date Value Ref Range Status   01/14/2019 0 0 /100[WBCs] Final       Lab Results   Component Value Date    GLUCOSE 92 10/05/2020    BUN  10/05/2020      Comment:      Testing performed by alternate method    CREATININE 1.09 10/05/2020    EGFRIFNONA 76 10/05/2020    BCR  10/05/2020      Comment:      Testing not performed    K 4.5 10/05/2020    CO2 29.0 10/05/2020    CALCIUM 9.5 10/05/2020    ALBUMIN 4.60 10/05/2020    LABIL2 1.3 03/28/2019    AST 12 10/05/2020    ALT 17 10/05/2020     Assessment/Plan     Thrombophilia (CMS/[facility])  - CBC & Differential  - Comprehensive Metabolic Panel    ASSESSMENT:    1. Chronic deep venous thrombosis involving the left lower extremity   2. History of positive lupus anticoagulant: Ongoing  management  3. Anticoagulation management: On Eliquis 5 mg p.o. twice daily  4. Patient has strong family history of cancer on the maternal side of the family.  His mother’s comprehensive gene test was negative.  Patient does not need genetic testing at this time unless there is family history of cancer on the paternal side.     Assessment reviewed, confirmed, updated on 10/5/2020    PLANS:  1. Reviewed labs from 6/16/2020  2. CBC and CMP ordered and reviewed  3. De-escalated anticoagulation therapy to Eliquis 2.5 mg p.o. twice daily  4. Educated patient on signs and symptoms of acute clots  5. Call for any new or unusual bleeding or bruising  6. Patient to call should he be scheduled for any surgical procedures in the near future  7. Schedule follow-up appointment Dr. Bowman in 3 months     I have reviewed lab results, imaging, vitals, and medications with the patient today.  Patient verbalized understanding and is in agreement with the above plan of care.    Electronically signed by LUCIANA Brantley, 10/05/20, 5:16 PM EDT.

## 2020-10-05 ENCOUNTER — LAB (OUTPATIENT)
Dept: LAB | Facility: HOSPITAL | Age: 40
End: 2020-10-05

## 2020-10-05 ENCOUNTER — OFFICE VISIT (OUTPATIENT)
Dept: ONCOLOGY | Facility: CLINIC | Age: 40
End: 2020-10-05

## 2020-10-05 VITALS
TEMPERATURE: 97.7 F | DIASTOLIC BLOOD PRESSURE: 78 MMHG | RESPIRATION RATE: 20 BRPM | BODY MASS INDEX: 38.51 KG/M2 | HEART RATE: 86 BPM | WEIGHT: 260 LBS | SYSTOLIC BLOOD PRESSURE: 113 MMHG | HEIGHT: 69 IN

## 2020-10-05 DIAGNOSIS — D68.59 THROMBOPHILIA (HCC): Primary | ICD-10-CM

## 2020-10-05 DIAGNOSIS — D68.59 THROMBOPHILIA (HCC): ICD-10-CM

## 2020-10-05 LAB
ALBUMIN SERPL-MCNC: 4.6 G/DL (ref 3.5–5.2)
ALBUMIN/GLOB SERPL: 1.8 G/DL
ALP SERPL-CCNC: 71 U/L (ref 39–117)
ALT SERPL W P-5'-P-CCNC: 17 U/L (ref 1–41)
ANION GAP SERPL CALCULATED.3IONS-SCNC: 9 MMOL/L (ref 5–15)
AST SERPL-CCNC: 12 U/L (ref 1–40)
BASOPHILS # BLD AUTO: 0.06 10*3/MM3 (ref 0–0.2)
BASOPHILS NFR BLD AUTO: 0.6 % (ref 0–1.5)
BILIRUB SERPL-MCNC: 0.9 MG/DL (ref 0–1.2)
BUN SERPL-MCNC: NORMAL MG/DL
BUN/CREAT SERPL: NORMAL
CALCIUM SPEC-SCNC: 9.5 MG/DL (ref 8.6–10.5)
CHLORIDE SERPL-SCNC: 105 MMOL/L (ref 98–107)
CO2 SERPL-SCNC: 29 MMOL/L (ref 22–29)
CREAT SERPL-MCNC: 1.09 MG/DL (ref 0.76–1.27)
DEPRECATED RDW RBC AUTO: 41.1 FL (ref 37–54)
EOSINOPHIL # BLD AUTO: 0.24 10*3/MM3 (ref 0–0.4)
EOSINOPHIL NFR BLD AUTO: 2.3 % (ref 0.3–6.2)
ERYTHROCYTE [DISTWIDTH] IN BLOOD BY AUTOMATED COUNT: 12.3 % (ref 12.3–15.4)
GFR SERPL CREATININE-BSD FRML MDRD: 76 ML/MIN/1.73
GLOBULIN UR ELPH-MCNC: 2.6 GM/DL
GLUCOSE SERPL-MCNC: 92 MG/DL (ref 65–99)
HCT VFR BLD AUTO: 44.3 % (ref 37.5–51)
HGB BLD-MCNC: 14.6 G/DL (ref 13–17.7)
LYMPHOCYTES # BLD AUTO: 2.21 10*3/MM3 (ref 0.7–3.1)
LYMPHOCYTES NFR BLD AUTO: 21.5 % (ref 19.6–45.3)
MCH RBC QN AUTO: 30.5 PG (ref 26.6–33)
MCHC RBC AUTO-ENTMCNC: 33 G/DL (ref 31.5–35.7)
MCV RBC AUTO: 92.7 FL (ref 79–97)
MONOCYTES # BLD AUTO: 0.67 10*3/MM3 (ref 0.1–0.9)
MONOCYTES NFR BLD AUTO: 6.5 % (ref 5–12)
NEUTROPHILS NFR BLD AUTO: 69.1 % (ref 42.7–76)
NEUTROPHILS NFR BLD AUTO: 7.09 10*3/MM3 (ref 1.7–7)
PLATELET # BLD AUTO: 271 10*3/MM3 (ref 140–450)
PMV BLD AUTO: 11.9 FL (ref 6–12)
POTASSIUM SERPL-SCNC: 4.5 MMOL/L (ref 3.5–5.2)
PROT SERPL-MCNC: 7.2 G/DL (ref 6–8.5)
RBC # BLD AUTO: 4.78 10*6/MM3 (ref 4.14–5.8)
SODIUM SERPL-SCNC: 143 MMOL/L (ref 136–145)
WBC # BLD AUTO: 10.27 10*3/MM3 (ref 3.4–10.8)

## 2020-10-05 PROCEDURE — 99213 OFFICE O/P EST LOW 20 MIN: CPT | Performed by: NURSE PRACTITIONER

## 2020-10-05 PROCEDURE — 36415 COLL VENOUS BLD VENIPUNCTURE: CPT

## 2020-10-05 PROCEDURE — 85025 COMPLETE CBC W/AUTO DIFF WBC: CPT

## 2020-10-05 PROCEDURE — 80053 COMPREHEN METABOLIC PANEL: CPT

## 2020-10-05 RX ORDER — LORATADINE 10 MG/1
CAPSULE, LIQUID FILLED ORAL
COMMUNITY

## 2020-10-06 LAB — BUN SERPL-MCNC: 13 MG/DL (ref 6–20)

## 2021-01-04 ENCOUNTER — APPOINTMENT (OUTPATIENT)
Dept: LAB | Facility: HOSPITAL | Age: 41
End: 2021-01-04

## 2021-01-04 ENCOUNTER — TELEPHONE (OUTPATIENT)
Dept: ONCOLOGY | Facility: CLINIC | Age: 41
End: 2021-01-04

## 2021-01-04 NOTE — TELEPHONE ENCOUNTER
Caller: Allie    Relationship to patient: April    Best call back number: [phone]    Type of visit: Lab and follow up     Requested date: Anyday except 01/06, prefers afternoon    If rescheduling, when is the original appointment: 01/04    Additional notes: HUB unable to reach non-clinical

## 2021-01-06 DIAGNOSIS — D68.59 THROMBOPHILIA (HCC): Primary | ICD-10-CM

## 2021-01-25 ENCOUNTER — APPOINTMENT (OUTPATIENT)
Dept: LAB | Facility: HOSPITAL | Age: 41
End: 2021-01-25

## 2021-01-25 ENCOUNTER — TELEPHONE (OUTPATIENT)
Dept: ONCOLOGY | Facility: CLINIC | Age: 41
End: 2021-01-25

## 2021-01-25 NOTE — TELEPHONE ENCOUNTER
Caller: GERARDO FERRARI  Relationship to patient: MOTHER    Best call back number: [phone]    PT IS SICK AND CANNOT MAKE HIS APPT TODAY 01/25 AT 1PM. PT NEEDS TO RESCHEDULE

## 2021-02-08 DIAGNOSIS — D68.59 THROMBOPHILIA (HCC): ICD-10-CM

## 2021-02-08 NOTE — TELEPHONE ENCOUNTER
Kayla thornton/ [employer] is calling stating that pt is needing a refill on his Eliquis 2.5 mg.  Chart reviewed and script from 1/28 is still pending but it has the wrong pharmacy listed.  Pt needs it sent to [facility] pharmacy.  New refill request pended for MD signature.  Received second vm from Kayla.  Attempted to call her back and had to leave a vm.  Left direct number to triage for her to call back.

## 2021-02-22 NOTE — PROGRESS NOTES
Hematology/Oncology Outpatient Follow Up    PATIENT NAME:Jesus Martínez  :1980  MRN: [id number]  PRIMARY CARE PHYSICIAN: Casimiro Adams MD  REFERRING PHYSICIAN: Casimiro Adams,*    Chief Complaint   Patient presents with   • Appointment     Thrombophilia (CMS/[facility])   • Follow-up        HISTORY OF PRESENT ILLNESS:     This is a 39-year-old male who developed DVT involving the left lower extremity back in .  At the time patient was seen by Dr. Harding, was diagnosed with positive lupus anticoagulant and has since then been on Eliquis.  He has had chronic pain, swelling involving the left lower extremity.  He has had several Doppler studies done to rule out an acute DVT.  The first Doppler done in 2015 revealed patient had thrombosis in the popliteal vein extending into the posterior tibial veins on the left side.  On 16 due to symptoms of pain and swelling, he had a repeat Doppler study which showed findings of chronic venous thrombosis of the left lower extremity deep venous system involving the left superficial femoral vein and popliteal vein with recanalization and formation of collaterals in the thigh.  Most recently on 16 patient was seen in the emergency room for chronic pain, swelling involving the left lower extremity.  Doppler done showed chronic deep venous thrombosis of the left superficial femoral vein and popliteal vein.  Patient is here today to have further discussions why his clot has not resolved if the Eliquis is working.  He denies chest pain, shortness of breath.  He has no prior history of thrombosis elsewhere in the past.  He wa accompanied by his mother for the appointment on 16.     • Since his last visit I have had more records released from Dr. Harding’s office.  On 12/1/15 apparently patient had hypercoagulable work up which was negative for protein C, factor V Leiden, prothrombin gene mutation, anticardiolipin antibodies and beta-2  glycoprotein antibody, but positive for lupus anticoagulant.  He had a repeat lupus anticoagulant on 3/16/16 which was negative.    • 12/13/16 - Negative mixing study for inhibitor effect in PTT.  Lupus inhibitor was not identified.    • 2/28/28 - Doppler ultrasound of the left lower extremity due to complaint of swelling.  This came back negative for acute DVT, but he does have chronic DVT in the left femoral vein.    • Patient’s comprehensive genetic analysis with [employer] technology was negative for any significant mutation in his mother.   • 7/3/18 - CMP:  BUN 10.  Creatinine 1.  Liver function tests, normal.   • 6/6/2020: Anticardiolipin antibodies normal, beta-2 glycoprotein antibodies normal, lupus anticoagulation not detected, but dRVVT 64.5.     HPI, ROS and PFSH have been reviewed and confirmed on 2/23/2021.     Past Medical History:   Diagnosis Date   • Anxiety    • Bipolar disorder (manic depression) (CMS/HCC)    • Chronic deep vein thrombosis (DVT) (CMS/HCC)    • GERD (gastroesophageal reflux disease)        Past Surgical History:   Procedure Laterality Date   • APPENDECTOMY     • EAR TUBES           Current Outpatient Medications:   •  Allergy Relief 10 MG tablet, Take 10 mg by mouth Daily., Disp: , Rfl:   •  apixaban (Eliquis) 5 MG tablet tablet, Eliquis 5 mg tablet, Disp: , Rfl:   •  atorvastatin (LIPITOR) 10 MG tablet, Take 10 mg by mouth every night at bedtime., Disp: , Rfl:   •  dexlansoprazole (DEXILANT) 60 MG capsule, Take 1 capsule by mouth Daily., Disp: 30 capsule, Rfl: 2  •  divalproex (DEPAKOTE) 500 MG 24 hr tablet, Take 1,500 mg by mouth Daily., Disp: , Rfl: 0  •  escitalopram (LEXAPRO) 10 MG tablet, Take 10 mg by mouth Daily., Disp: , Rfl: 3  •  fluticasone (FLONASE) 50 MCG/ACT nasal spray, FLONASE 50 MCG/ACT NASAL SUSPENSION, Disp: , Rfl:   •  levothyroxine (SYNTHROID, LEVOTHROID) 50 MCG tablet, Take 50 mcg by mouth Daily., Disp: , Rfl:   •  lithium (LITHOBID) 300 MG CR tablet, Take 300  mg by mouth Daily., Disp: , Rfl:   •  Loratadine 10 MG capsule, Take  by mouth., Disp: , Rfl:   •  Vraylar 1.5 MG capsule capsule, Take 1.5 mg by mouth Daily., Disp: , Rfl:     Allergies   Allergen Reactions   • Aripiprazole Rash and Swelling   • Cephalexin Rash   • Penicillins Rash   • Risperidone Unknown (See Comments)       Family History   Problem Relation Age of Onset   • Colon cancer Maternal Grandmother 55   • Ovarian cancer Maternal Grandmother    • Basal cell carcinoma Maternal Grandmother         skin   • Kidney cancer Maternal Grandfather 51   • Leukemia Other    • Ovarian cancer Other    • Melanoma Other        Cancer-related family history includes Colon cancer (age of onset: 55) in his maternal grandmother; Kidney cancer (age of onset: 51) in his maternal grandfather; Melanoma in an other family member; Ovarian cancer in his maternal grandmother and another family member.    Social History     Tobacco Use   • Smoking status: Former Smoker     Quit date: 10/3/2017     Years since quitting: 3.3   • Smokeless tobacco: Never Used   Substance Use Topics   • Alcohol use: No     Frequency: Never   • Drug use: No     HPI, ROS and PFSH have been reviewed and confirmed on 2/23/2021.     SUBJECTIVE:  Patient presents to the clinic for follow-up appointment.  He denies new issues or changes to his health care.  He denies any bleeding problems.  He remains on Eliquis 2.5 mg p.o. twice a day.    REVIEW OF SYSTEMS:  Review of Systems   Constitutional: Negative for chills, fatigue and fever.   HENT: Negative for mouth sores and nosebleeds.    Respiratory: Negative for chest tightness and shortness of breath.    Cardiovascular: Negative for chest pain and leg swelling.   Gastrointestinal: Negative for blood in stool, diarrhea, nausea and vomiting.   Genitourinary: Negative for hematuria.   Musculoskeletal: Negative for arthralgias and myalgias.   Skin: Negative for rash and wound.   Neurological: Negative for dizziness  "and headaches.   Hematological: Does not bruise/bleed easily.   Psychiatric/Behavioral: Negative for confusion.     OBJECTIVE:    Vitals:    02/23/21 1219   BP: 116/80   Pulse: 85   Resp: 18   Temp: 98 °F (36.7 °C)   TempSrc: Temporal   Weight: 123 kg (271 lb)   Height: 175.3 cm (69\")   PainSc: 0-No pain       ECOG  (0) Fully active, able to carry on all predisease performance without restriction    Physical Exam   Constitutional: He is oriented to person, place, and time.  Non-toxic appearance. He does not appear ill. No distress.   HENT:   Head: Normocephalic and atraumatic.   Eyes: Conjunctivae are normal. Right eye exhibits no discharge. No scleral icterus.   Neck: Normal range of motion. No muscular tenderness present. No neck rigidity.   Cardiovascular: Normal rate, regular rhythm and normal heart sounds.   Pulmonary/Chest: Effort normal and breath sounds normal. No respiratory distress. He has no wheezes.   Abdominal: Soft. Normal appearance and bowel sounds are normal. He exhibits no distension. There is no abdominal tenderness.   Musculoskeletal: Normal range of motion.      Comments: Mild left lower extremity swelling, no erythema noted, non-pitting   Lymphadenopathy:     He has no cervical adenopathy.   Neurological: He is alert and oriented to person, place, and time.   Skin: Skin is warm. No bruising noted. He is not diaphoretic. No jaundice.   Psychiatric: His behavior is normal. Mood normal.   Nursing note and vitals reviewed.  I have reexamined the patient and the results are consistent with the previously documented exam. Earline Bowman MD     RECENT LABS  WBC   Date Value Ref Range Status   02/23/2021 9.17 3.40 - 10.80 10*3/mm3 Final     RBC   Date Value Ref Range Status   02/23/2021 5.26 4.14 - 5.80 10*6/mm3 Final     Hemoglobin   Date Value Ref Range Status   02/23/2021 15.7 13.0 - 17.7 g/dL Final     Hematocrit   Date Value Ref Range Status   02/23/2021 47.9 37.5 - 51.0 % Final     MCV "   Date Value Ref Range Status   02/23/2021 91.1 79.0 - 97.0 fL Final     MCH   Date Value Ref Range Status   02/23/2021 29.8 26.6 - 33.0 pg Final     MCHC   Date Value Ref Range Status   02/23/2021 32.8 31.5 - 35.7 g/dL Final     RDW   Date Value Ref Range Status   02/23/2021 13.3 12.3 - 15.4 % Final     RDW-SD   Date Value Ref Range Status   02/23/2021 43.6 37.0 - 54.0 fl Final     MPV   Date Value Ref Range Status   02/23/2021 11.9 6.0 - 12.0 fL Final     Platelets   Date Value Ref Range Status   02/23/2021 286 140 - 450 10*3/mm3 Final     Neutrophil %   Date Value Ref Range Status   02/23/2021 65.3 42.7 - 76.0 % Final     Lymphocyte %   Date Value Ref Range Status   02/23/2021 23.4 19.6 - 45.3 % Final     Monocyte %   Date Value Ref Range Status   02/23/2021 7.6 5.0 - 12.0 % Final     Eosinophil %   Date Value Ref Range Status   02/23/2021 3.3 0.3 - 6.2 % Final     Basophil %   Date Value Ref Range Status   02/23/2021 0.4 0.0 - 1.5 % Final     Neutrophils, Absolute   Date Value Ref Range Status   02/23/2021 5.98 1.70 - 7.00 10*3/mm3 Final     Lymphocytes, Absolute   Date Value Ref Range Status   02/23/2021 2.15 0.70 - 3.10 10*3/mm3 Final     Monocytes, Absolute   Date Value Ref Range Status   02/23/2021 0.70 0.10 - 0.90 10*3/mm3 Final     Eosinophils, Absolute   Date Value Ref Range Status   02/23/2021 0.30 0.00 - 0.40 10*3/mm3 Final     Basophils, Absolute   Date Value Ref Range Status   02/23/2021 0.04 0.00 - 0.20 10*3/mm3 Final     nRBC   Date Value Ref Range Status   01/14/2019 0 0 /100[WBCs] Final       Lab Results   Component Value Date    GLUCOSE 98 02/23/2021    BUN 14 02/23/2021    CREATININE 1.02 02/23/2021    EGFRIFNONA 82 02/23/2021    BCR 13.7 02/23/2021    K 4.1 02/23/2021    CO2 26.0 02/23/2021    CALCIUM 9.5 02/23/2021    ALBUMIN 4.20 02/23/2021    LABIL2 1.3 03/28/2019    AST 14 02/23/2021    ALT 17 02/23/2021     Assessment/Plan     Thrombophilia (CMS/HCC)  - CBC & Differential  - Comprehensive  Metabolic Panel    ASSESSMENT:    1. Chronic deep venous thrombosis involving the left lower extremity   2. History of positive lupus anticoagulant: Ongoing management  3. Anticoagulation management: On Eliquis 2.5 mg p.o. twice daily  4. Patient has strong family history of cancer on the maternal side of the family.  His mother’s comprehensive gene test was negative.  Patient does not need genetic testing at this time unless there is family history of cancer on the paternal side.     Assessment reviewed, confirmed, updated     PLANS:  1. Reviewed his CBC, CMP will be ordered today  2. Continue anticoagulation therapy to Eliquis 2.5 mg p.o. twice daily  3. Educated patient on signs and symptoms of acute clots  4. Call for any new or unusual bleeding or bruising  5. Patient to call should he be scheduled for any surgical procedures in the near future  6. Follow-up with me in 6 months.  Patient to call me earlier as needed for problems or any surgical procedures.     I have reviewed lab results, imaging, vitals, and medications with the patient today.  Patient verbalized understanding and is in agreement with the above plan of care.

## 2021-02-23 ENCOUNTER — OFFICE VISIT (OUTPATIENT)
Dept: ONCOLOGY | Facility: CLINIC | Age: 41
End: 2021-02-23

## 2021-02-23 ENCOUNTER — LAB (OUTPATIENT)
Dept: LAB | Facility: HOSPITAL | Age: 41
End: 2021-02-23

## 2021-02-23 VITALS
SYSTOLIC BLOOD PRESSURE: 116 MMHG | RESPIRATION RATE: 18 BRPM | DIASTOLIC BLOOD PRESSURE: 80 MMHG | BODY MASS INDEX: 40.14 KG/M2 | HEIGHT: 69 IN | TEMPERATURE: 98 F | WEIGHT: 271 LBS | HEART RATE: 85 BPM

## 2021-02-23 DIAGNOSIS — D68.59 THROMBOPHILIA (HCC): Primary | ICD-10-CM

## 2021-02-23 DIAGNOSIS — D68.59 THROMBOPHILIA (HCC): ICD-10-CM

## 2021-02-23 LAB
ALBUMIN SERPL-MCNC: 4.2 G/DL (ref 3.5–5.2)
ALBUMIN/GLOB SERPL: 1.3 G/DL
ALP SERPL-CCNC: 61 U/L (ref 39–117)
ALT SERPL W P-5'-P-CCNC: 17 U/L (ref 1–41)
ANION GAP SERPL CALCULATED.3IONS-SCNC: 10 MMOL/L (ref 5–15)
AST SERPL-CCNC: 14 U/L (ref 1–40)
BASOPHILS # BLD AUTO: 0.04 10*3/MM3 (ref 0–0.2)
BASOPHILS NFR BLD AUTO: 0.4 % (ref 0–1.5)
BILIRUB SERPL-MCNC: 0.5 MG/DL (ref 0–1.2)
BUN SERPL-MCNC: 14 MG/DL (ref 6–20)
BUN/CREAT SERPL: 13.7 (ref 7–25)
CALCIUM SPEC-SCNC: 9.5 MG/DL (ref 8.6–10.5)
CHLORIDE SERPL-SCNC: 108 MMOL/L (ref 98–107)
CO2 SERPL-SCNC: 26 MMOL/L (ref 22–29)
CREAT SERPL-MCNC: 1.02 MG/DL (ref 0.76–1.27)
DEPRECATED RDW RBC AUTO: 43.6 FL (ref 37–54)
EOSINOPHIL # BLD AUTO: 0.3 10*3/MM3 (ref 0–0.4)
EOSINOPHIL NFR BLD AUTO: 3.3 % (ref 0.3–6.2)
ERYTHROCYTE [DISTWIDTH] IN BLOOD BY AUTOMATED COUNT: 13.3 % (ref 12.3–15.4)
GFR SERPL CREATININE-BSD FRML MDRD: 82 ML/MIN/1.73
GLOBULIN UR ELPH-MCNC: 3.2 GM/DL
GLUCOSE SERPL-MCNC: 98 MG/DL (ref 65–99)
HCT VFR BLD AUTO: 47.9 % (ref 37.5–51)
HGB BLD-MCNC: 15.7 G/DL (ref 13–17.7)
LYMPHOCYTES # BLD AUTO: 2.15 10*3/MM3 (ref 0.7–3.1)
LYMPHOCYTES NFR BLD AUTO: 23.4 % (ref 19.6–45.3)
MCH RBC QN AUTO: 29.8 PG (ref 26.6–33)
MCHC RBC AUTO-ENTMCNC: 32.8 G/DL (ref 31.5–35.7)
MCV RBC AUTO: 91.1 FL (ref 79–97)
MONOCYTES # BLD AUTO: 0.7 10*3/MM3 (ref 0.1–0.9)
MONOCYTES NFR BLD AUTO: 7.6 % (ref 5–12)
NEUTROPHILS NFR BLD AUTO: 5.98 10*3/MM3 (ref 1.7–7)
NEUTROPHILS NFR BLD AUTO: 65.3 % (ref 42.7–76)
PLATELET # BLD AUTO: 286 10*3/MM3 (ref 140–450)
PMV BLD AUTO: 11.9 FL (ref 6–12)
POTASSIUM SERPL-SCNC: 4.1 MMOL/L (ref 3.5–5.2)
PROT SERPL-MCNC: 7.4 G/DL (ref 6–8.5)
RBC # BLD AUTO: 5.26 10*6/MM3 (ref 4.14–5.8)
SODIUM SERPL-SCNC: 144 MMOL/L (ref 136–145)
WBC # BLD AUTO: 9.17 10*3/MM3 (ref 3.4–10.8)

## 2021-02-23 PROCEDURE — 99214 OFFICE O/P EST MOD 30 MIN: CPT | Performed by: INTERNAL MEDICINE

## 2021-02-23 PROCEDURE — 80053 COMPREHEN METABOLIC PANEL: CPT | Performed by: INTERNAL MEDICINE

## 2021-02-23 PROCEDURE — 85025 COMPLETE CBC W/AUTO DIFF WBC: CPT | Performed by: INTERNAL MEDICINE

## 2021-02-23 RX ORDER — CARIPRAZINE 1.5 MG/1
1.5 CAPSULE, GELATIN COATED ORAL DAILY
COMMUNITY
Start: 2021-02-11

## 2021-02-23 RX ORDER — ATORVASTATIN CALCIUM 10 MG/1
10 TABLET, FILM COATED ORAL
COMMUNITY
Start: 2021-02-01

## 2021-02-23 RX ORDER — LITHIUM CARBONATE 300 MG/1
300 TABLET, FILM COATED, EXTENDED RELEASE ORAL DAILY
COMMUNITY
Start: 2021-02-08

## 2021-02-23 RX ORDER — LORATADINE 10 MG/1
10 TABLET ORAL DAILY
COMMUNITY
Start: 2021-02-18

## 2021-02-23 RX ORDER — LEVOTHYROXINE SODIUM 0.05 MG/1
50 TABLET ORAL DAILY
COMMUNITY
Start: 2020-11-30

## 2021-03-05 ENCOUNTER — TELEPHONE (OUTPATIENT)
Dept: ONCOLOGY | Facility: CLINIC | Age: 41
End: 2021-03-05

## 2021-03-05 NOTE — TELEPHONE ENCOUNTER
I received a refill request for Eliquis, but there were some discrepancies on the dose that the pt is taking. Previous notes said 2.5 mg but his medication list said 5 mg. I called his pharmacy and they said he is taking 2.5 mg BID and that he has a refill available. I attempted to call the pt to let him know that he has a refill available, message left on identifying VM.

## 2021-08-24 ENCOUNTER — OFFICE VISIT (OUTPATIENT)
Dept: ONCOLOGY | Facility: CLINIC | Age: 41
End: 2021-08-24

## 2021-08-24 ENCOUNTER — APPOINTMENT (OUTPATIENT)
Dept: LAB | Facility: HOSPITAL | Age: 41
End: 2021-08-24

## 2021-08-24 VITALS
SYSTOLIC BLOOD PRESSURE: 120 MMHG | TEMPERATURE: 97.3 F | RESPIRATION RATE: 18 BRPM | HEART RATE: 76 BPM | BODY MASS INDEX: 40.73 KG/M2 | WEIGHT: 275 LBS | HEIGHT: 69 IN | DIASTOLIC BLOOD PRESSURE: 82 MMHG

## 2021-08-24 DIAGNOSIS — D68.59 THROMBOPHILIA (HCC): Primary | ICD-10-CM

## 2021-08-24 LAB
BASOPHILS # BLD AUTO: 0.06 10*3/MM3 (ref 0–0.2)
BASOPHILS NFR BLD AUTO: 0.5 % (ref 0–1.5)
DEPRECATED RDW RBC AUTO: 43 FL (ref 37–54)
EOSINOPHIL # BLD AUTO: 0.3 10*3/MM3 (ref 0–0.4)
EOSINOPHIL NFR BLD AUTO: 2.4 % (ref 0.3–6.2)
ERYTHROCYTE [DISTWIDTH] IN BLOOD BY AUTOMATED COUNT: 13.1 % (ref 12.3–15.4)
HCT VFR BLD AUTO: 45.4 % (ref 37.5–51)
HGB BLD-MCNC: 15.2 G/DL (ref 13–17.7)
LYMPHOCYTES # BLD AUTO: 2.29 10*3/MM3 (ref 0.7–3.1)
LYMPHOCYTES NFR BLD AUTO: 18.7 % (ref 19.6–45.3)
MCH RBC QN AUTO: 31 PG (ref 26.6–33)
MCHC RBC AUTO-ENTMCNC: 33.5 G/DL (ref 31.5–35.7)
MCV RBC AUTO: 92.5 FL (ref 79–97)
MONOCYTES # BLD AUTO: 0.89 10*3/MM3 (ref 0.1–0.9)
MONOCYTES NFR BLD AUTO: 7.3 % (ref 5–12)
NEUTROPHILS NFR BLD AUTO: 71.1 % (ref 42.7–76)
NEUTROPHILS NFR BLD AUTO: 8.72 10*3/MM3 (ref 1.7–7)
PLATELET # BLD AUTO: 286 10*3/MM3 (ref 140–450)
PMV BLD AUTO: 12.2 FL (ref 6–12)
RBC # BLD AUTO: 4.91 10*6/MM3 (ref 4.14–5.8)
WBC # BLD AUTO: 12.26 10*3/MM3 (ref 3.4–10.8)

## 2021-08-24 PROCEDURE — 99214 OFFICE O/P EST MOD 30 MIN: CPT | Performed by: INTERNAL MEDICINE

## 2021-08-24 PROCEDURE — 36415 COLL VENOUS BLD VENIPUNCTURE: CPT | Performed by: INTERNAL MEDICINE

## 2021-08-24 PROCEDURE — 85025 COMPLETE CBC W/AUTO DIFF WBC: CPT | Performed by: INTERNAL MEDICINE

## 2021-08-24 RX ORDER — PROPRANOLOL HYDROCHLORIDE 20 MG/1
20 TABLET ORAL 2 TIMES DAILY
COMMUNITY
Start: 2021-08-16

## 2021-08-24 NOTE — PROGRESS NOTES
Hematology/Oncology Outpatient Follow Up    PATIENT NAME:Jesus Martínez  :1980  MRN: [id number]  PRIMARY CARE PHYSICIAN: Casimiro Adams MD  REFERRING PHYSICIAN: Casimiro Adams,*    Chief Complaint   Patient presents with   • Follow-up     Thrombophilia    • Follow-up     on Eliquis         HISTORY OF PRESENT ILLNESS:     This is a 40-year-old male who developed DVT involving the left lower extremity back in .  At the time patient was seen by Dr. Harding, was diagnosed with positive lupus anticoagulant and has since then been on Eliquis.  He has had chronic pain, swelling involving the left lower extremity.  He has had several Doppler studies done to rule out an acute DVT.  The first Doppler done in 2015 revealed patient had thrombosis in the popliteal vein extending into the posterior tibial veins on the left side.  On 16 due to symptoms of pain and swelling, he had a repeat Doppler study which showed findings of chronic venous thrombosis of the left lower extremity deep venous system involving the left superficial femoral vein and popliteal vein with recanalization and formation of collaterals in the thigh.  Most recently on 16 patient was seen in the emergency room for chronic pain, swelling involving the left lower extremity.  Doppler done showed chronic deep venous thrombosis of the left superficial femoral vein and popliteal vein.  Patient is here today to have further discussions why his clot has not resolved if the Eliquis is working.  He denies chest pain, shortness of breath.  He has no prior history of thrombosis elsewhere in the past.  He wa accompanied by his mother for the appointment on 16.     • Since his last visit I have had more records released from Dr. Harding’s office.  On 12/1/15 apparently patient had hypercoagulable work up which was negative for protein C, factor V Leiden, prothrombin gene mutation, anticardiolipin antibodies and  beta-2 glycoprotein antibody, but positive for lupus anticoagulant.  He had a repeat lupus anticoagulant on 3/16/16 which was negative.    • 12/13/16 - Negative mixing study for inhibitor effect in PTT.  Lupus inhibitor was not identified.    • 2/28/28 - Doppler ultrasound of the left lower extremity due to complaint of swelling.  This came back negative for acute DVT, but he does have chronic DVT in the left femoral vein.    • Patient’s comprehensive genetic analysis with [employer] was negative for any significant mutation in his mother.   • 7/3/18 - CMP:  BUN 10.  Creatinine 1.  Liver function tests, normal.   • 6/6/2020: Anticardiolipin antibodies normal, beta-2 glycoprotein antibodies normal, lupus anticoagulation not detected, but dRVVT 64.5.     HPI, ROS and PFSH have been reviewed and confirmed on 8/24/2021.     Past Medical History:   Diagnosis Date   • Anxiety    • Bipolar disorder (manic depression) (CMS/HCC)    • Chronic deep vein thrombosis (DVT) (CMS/HCC)    • GERD (gastroesophageal reflux disease)        Past Surgical History:   Procedure Laterality Date   • APPENDECTOMY     • EAR TUBES           Current Outpatient Medications:   •  Allergy Relief 10 MG tablet, Take 10 mg by mouth Daily., Disp: , Rfl:   •  apixaban (ELIQUIS) 2.5 MG tablet tablet, Take 2.5 mg by mouth 2 (Two) Times a Day., Disp: , Rfl:   •  atorvastatin (LIPITOR) 10 MG tablet, Take 10 mg by mouth every night at bedtime., Disp: , Rfl:   •  dexlansoprazole (DEXILANT) 60 MG capsule, Take 1 capsule by mouth Daily., Disp: 30 capsule, Rfl: 2  •  divalproex (DEPAKOTE) 500 MG 24 hr tablet, Take 1,500 mg by mouth Daily., Disp: , Rfl: 0  •  escitalopram (LEXAPRO) 10 MG tablet, Take 10 mg by mouth Daily., Disp: , Rfl: 3  •  fluticasone (FLONASE) 50 MCG/ACT nasal spray, FLONASE 50 MCG/ACT NASAL SUSPENSION, Disp: , Rfl:   •  levothyroxine (SYNTHROID, LEVOTHROID) 50 MCG tablet, Take 50 mcg by mouth Daily., Disp: , Rfl:   •  lithium  (LITHOBID) 300 MG CR tablet, Take 300 mg by mouth Daily., Disp: , Rfl:   •  Loratadine 10 MG capsule, Take  by mouth., Disp: , Rfl:   •  Vraylar 1.5 MG capsule capsule, Take 1.5 mg by mouth Daily., Disp: , Rfl:   •  propranolol (INDERAL) 20 MG tablet, Take 20 mg by mouth 2 (Two) Times a Day., Disp: , Rfl:     Allergies   Allergen Reactions   • Aripiprazole Rash and Swelling   • Cephalexin Rash   • Penicillins Rash   • Risperidone Unknown (See Comments)       Family History   Problem Relation Age of Onset   • Colon cancer Maternal Grandmother 55   • Ovarian cancer Maternal Grandmother    • Basal cell carcinoma Maternal Grandmother         skin   • Kidney cancer Maternal Grandfather 51   • Leukemia Other    • Ovarian cancer Other    • Melanoma Other        Cancer-related family history includes Colon cancer (age of onset: 55) in his maternal grandmother; Kidney cancer (age of onset: 51) in his maternal grandfather; Melanoma in an other family member; Ovarian cancer in his maternal grandmother and another family member.    Social History     Tobacco Use   • Smoking status: Former Smoker     Quit date: 10/3/2017     Years since quitting: 3.8   • Smokeless tobacco: Never Used   Substance Use Topics   • Alcohol use: No   • Drug use: No     HPI, ROS and PFSH have been reviewed and confirmed on 8/24/2021.     SUBJECTIVE:  Patient presents to the clinic for follow-up appointment.  He denies new issues or changes to his health care.  He denies any bleeding problems.  He remains on Eliquis 2.5 mg p.o. twice a day.    REVIEW OF SYSTEMS:  Review of Systems   Constitutional: Negative for chills, fatigue and fever.   HENT: Negative for congestion, mouth sores, nosebleeds, rhinorrhea, sore throat and trouble swallowing.    Respiratory: Negative for cough, chest tightness and shortness of breath.    Cardiovascular: Negative for chest pain and leg swelling.   Gastrointestinal: Negative for anal bleeding, blood in stool, diarrhea,  "nausea and vomiting.   Genitourinary: Negative for dysuria, flank pain, hematuria and urgency.   Musculoskeletal: Negative for arthralgias, back pain, gait problem, myalgias, neck pain and neck stiffness.   Skin: Negative for rash and wound.   Allergic/Immunologic: Positive for environmental allergies.   Neurological: Negative for dizziness, tremors, seizures, speech difficulty, weakness, light-headedness, numbness and headaches.   Hematological: Does not bruise/bleed easily.   Psychiatric/Behavioral: Positive for sleep disturbance. Negative for confusion. The patient is not nervous/anxious.         Has chronic issue with excessive sleepiness     OBJECTIVE:    Vitals:    08/24/21 1247   BP: 120/82   Pulse: 76   Resp: 18   Temp: 97.3 °F (36.3 °C)   TempSrc: Oral   Weight: 125 kg (275 lb)   Height: 175.3 cm (69\")   PainSc: 0-No pain       ECOG  (0) Fully active, able to carry on all predisease performance without restriction    Physical Exam   Constitutional: He is oriented to person, place, and time.  Non-toxic appearance. He does not appear ill. No distress.   HENT:   Head: Normocephalic and atraumatic.   Right Ear: Tympanic membrane, external ear and ear canal normal.   Left Ear: Tympanic membrane, external ear and ear canal normal.   Nose: Rhinorrhea present. No congestion.   Mouth/Throat: Mucous membranes are moist. No oropharyngeal exudate or posterior oropharyngeal erythema. Oropharynx is clear.   Eyes: Pupils are equal, round, and reactive to light. Conjunctivae are normal. Right eye exhibits no discharge. No scleral icterus.   Cardiovascular: Normal rate, regular rhythm, normal heart sounds and normal pulses.   Pulmonary/Chest: Effort normal and breath sounds normal. No respiratory distress. He has no wheezes.   Abdominal: Soft. Normal appearance and bowel sounds are normal. He exhibits no distension. There is no abdominal tenderness.   Genitourinary:    Genitourinary Comments: deferred     Musculoskeletal: " Normal range of motion. No swelling or tenderness.   Lymphadenopathy:     He has no cervical adenopathy.   Neurological: He is alert and oriented to person, place, and time.   Skin: Skin is warm. Capillary refill takes less than 2 seconds. No bruising noted. He is not diaphoretic. No jaundice.   Psychiatric: His behavior is normal. Mood, judgment and thought content normal.   Nursing note and vitals reviewed.  I have reexamined the patient and the results are consistent with the previously documented exam. Meyrl Smith, LUCIANA     RECENT LABS  WBC   Date Value Ref Range Status   08/24/2021 12.26 (H) 3.40 - 10.80 10*3/mm3 Final     RBC   Date Value Ref Range Status   08/24/2021 4.91 4.14 - 5.80 10*6/mm3 Final     Hemoglobin   Date Value Ref Range Status   08/24/2021 15.2 13.0 - 17.7 g/dL Final     Hematocrit   Date Value Ref Range Status   08/24/2021 45.4 37.5 - 51.0 % Final     MCV   Date Value Ref Range Status   08/24/2021 92.5 79.0 - 97.0 fL Final     MCH   Date Value Ref Range Status   08/24/2021 31.0 26.6 - 33.0 pg Final     MCHC   Date Value Ref Range Status   08/24/2021 33.5 31.5 - 35.7 g/dL Final     RDW   Date Value Ref Range Status   08/24/2021 13.1 12.3 - 15.4 % Final     RDW-SD   Date Value Ref Range Status   08/24/2021 43.0 37.0 - 54.0 fl Final     MPV   Date Value Ref Range Status   08/24/2021 12.2 (H) 6.0 - 12.0 fL Final     Platelets   Date Value Ref Range Status   08/24/2021 286 140 - 450 10*3/mm3 Final     Neutrophil %   Date Value Ref Range Status   08/24/2021 71.1 42.7 - 76.0 % Final     Lymphocyte %   Date Value Ref Range Status   08/24/2021 18.7 (L) 19.6 - 45.3 % Final     Monocyte %   Date Value Ref Range Status   08/24/2021 7.3 5.0 - 12.0 % Final     Eosinophil %   Date Value Ref Range Status   08/24/2021 2.4 0.3 - 6.2 % Final     Basophil %   Date Value Ref Range Status   08/24/2021 0.5 0.0 - 1.5 % Final     Neutrophils, Absolute   Date Value Ref Range Status   08/24/2021 8.72 (H) 1.70 - 7.00  10*3/mm3 Final     Lymphocytes, Absolute   Date Value Ref Range Status   08/24/2021 2.29 0.70 - 3.10 10*3/mm3 Final     Monocytes, Absolute   Date Value Ref Range Status   08/24/2021 0.89 0.10 - 0.90 10*3/mm3 Final     Eosinophils, Absolute   Date Value Ref Range Status   08/24/2021 0.30 0.00 - 0.40 10*3/mm3 Final     Basophils, Absolute   Date Value Ref Range Status   08/24/2021 0.06 0.00 - 0.20 10*3/mm3 Final     nRBC   Date Value Ref Range Status   01/14/2019 0 0 /100[WBCs] Final       Lab Results   Component Value Date    GLUCOSE 98 02/23/2021    BUN 14 02/23/2021    CREATININE 1.02 02/23/2021    EGFRIFNONA 82 02/23/2021    BCR 13.7 02/23/2021    K 4.1 02/23/2021    CO2 26.0 02/23/2021    CALCIUM 9.5 02/23/2021    ALBUMIN 4.20 02/23/2021    LABIL2 1.3 03/28/2019    AST 14 02/23/2021    ALT 17 02/23/2021     Assessment/Plan     Thrombophilia (CMS/[facility])  - CBC & Differential    ASSESSMENT:    1. Chronic deep venous thrombosis involving the left lower extremity   2. History of positive lupus anticoagulant: Ongoing management  3. Anticoagulation management: On Eliquis 2.5 mg p.o. twice daily  4. Patient has strong family history of cancer on the maternal side of the family.  His mother’s comprehensive gene test was negative.  Patient does not need genetic testing at this time unless there is family history of cancer on the paternal side.     Assessment reviewed, confirmed, updated     PLANS:  1. Reviewed his CBC, CMP with patient. WBC little elevated at 12.26- patient denies any physical symptoms, physical examination unremarkable. No enlarged lymph nodes.   2. Continue anticoagulation therapy to Eliquis 2.5 mg p.o. twice daily  3. Educated patient on signs and symptoms of acute clots  4. Call for any new or unusual bleeding or bruising  5. Patient to call should he be scheduled for any surgical procedures in the near future  6. Follow-up with  in 6 months.  Patient to call me earlier as needed for problems  or any surgical procedures.  7.   All questions answered, Patient verbalized understanding and is in agreement with the above plan of care         I have reviewed lab results, imaging, vitals, and medications with the patient today.    Electronically signed by LUCIANA Aldridge, 08/24/21, 1:07 PM EDT.

## 2022-02-21 NOTE — PROGRESS NOTES
Hematology/Oncology Outpatient Follow Up    PATIENT NAME:Jesus Martínez  :1980  MRN: [id number]  PRIMARY CARE PHYSICIAN: Casimiro Adams MD  REFERRING PHYSICIAN: Casimiro Adams,*    Chief Complaint   Patient presents with   • Follow-up     Thrombophilia (CMS/HCC)        HISTORY OF PRESENT ILLNESS:     This is a 39-year-old male who developed DVT involving the left lower extremity back in .  At the time patient was seen by Dr. Harding, was diagnosed with positive lupus anticoagulant and has since then been on Eliquis.  He has had chronic pain, swelling involving the left lower extremity.  He has had several Doppler studies done to rule out an acute DVT.  The first Doppler done in 2015 revealed patient had thrombosis in the popliteal vein extending into the posterior tibial veins on the left side.  On 16 due to symptoms of pain and swelling, he had a repeat Doppler study which showed findings of chronic venous thrombosis of the left lower extremity deep venous system involving the left superficial femoral vein and popliteal vein with recanalization and formation of collaterals in the thigh.  Most recently on 16 patient was seen in the emergency room for chronic pain, swelling involving the left lower extremity.  Doppler done showed chronic deep venous thrombosis of the left superficial femoral vein and popliteal vein.  Patient is here today to have further discussions why his clot has not resolved if the Eliquis is working.  He denies chest pain, shortness of breath.  He has no prior history of thrombosis elsewhere in the past.  He wa accompanied by his mother for the appointment on 16.     • Since his last visit I have had more records released from Dr. Harding’s office.  On 12/1/15 apparently patient had hypercoagulable work up which was negative for protein C, factor V Leiden, prothrombin gene mutation, anticardiolipin antibodies and beta-2 glycoprotein  antibody, but positive for lupus anticoagulant.  He had a repeat lupus anticoagulant on 3/16/16 which was negative.    • 12/13/16 - Negative mixing study for inhibitor effect in PTT.  Lupus inhibitor was not identified.    • 2/28/28 - Doppler ultrasound of the left lower extremity due to complaint of swelling.  This came back negative for acute DVT, but he does have chronic DVT in the left femoral vein.    • Patient’s comprehensive genetic analysis with [employer] was negative for any significant mutation in his mother.   • 7/3/18 - CMP:  BUN 10.  Creatinine 1.  Liver function tests, normal.   • 6/6/2020: Anticardiolipin antibodies normal, beta-2 glycoprotein antibodies normal, lupus anticoagulation not detected, but dRVVT 64.5.     HPI, ROS and PFSH have been reviewed and confirmed on 2/22/2022.     Past Medical History:   Diagnosis Date   • Anxiety    • Bipolar disorder (manic depression) (CMS/HCC)    • Chronic deep vein thrombosis (DVT) (CMS/HCC)    • GERD (gastroesophageal reflux disease)        Past Surgical History:   Procedure Laterality Date   • APPENDECTOMY     • EAR TUBES           Current Outpatient Medications:   •  Allergy Relief 10 MG tablet, Take 10 mg by mouth Daily., Disp: , Rfl:   •  apixaban (ELIQUIS) 2.5 MG tablet tablet, Take 1 tablet by mouth Every 12 (Twelve) Hours., Disp: 60 tablet, Rfl: 5  •  atorvastatin (LIPITOR) 10 MG tablet, Take 10 mg by mouth every night at bedtime., Disp: , Rfl:   •  dexlansoprazole (DEXILANT) 60 MG capsule, Take 1 capsule by mouth Daily., Disp: 30 capsule, Rfl: 2  •  divalproex (DEPAKOTE) 500 MG 24 hr tablet, Take 1,500 mg by mouth Daily., Disp: , Rfl: 0  •  escitalopram (LEXAPRO) 10 MG tablet, Take 10 mg by mouth Daily., Disp: , Rfl: 3  •  fluticasone (FLONASE) 50 MCG/ACT nasal spray, FLONASE 50 MCG/ACT NASAL SUSPENSION, Disp: , Rfl:   •  levothyroxine (SYNTHROID, LEVOTHROID) 50 MCG tablet, Take 50 mcg by mouth Daily., Disp: , Rfl:   •  lithium (LITHOBID)  300 MG CR tablet, Take 300 mg by mouth Daily., Disp: , Rfl:   •  Loratadine 10 MG capsule, Take  by mouth., Disp: , Rfl:   •  propranolol (INDERAL) 20 MG tablet, Take 20 mg by mouth 2 (Two) Times a Day., Disp: , Rfl:   •  trihexyphenidyl (ARTANE) 5 MG tablet, , Disp: , Rfl:   •  Vraylar 1.5 MG capsule capsule, Take 1.5 mg by mouth Daily., Disp: , Rfl:     Allergies   Allergen Reactions   • Aripiprazole Rash and Swelling   • Cephalexin Rash   • Penicillins Rash   • Risperidone Unknown (See Comments)       Family History   Problem Relation Age of Onset   • Colon cancer Maternal Grandmother 55   • Ovarian cancer Maternal Grandmother    • Basal cell carcinoma Maternal Grandmother         skin   • Kidney cancer Maternal Grandfather 51   • Leukemia Other    • Ovarian cancer Other    • Melanoma Other        Cancer-related family history includes Colon cancer (age of onset: 55) in his maternal grandmother; Kidney cancer (age of onset: 51) in his maternal grandfather; Melanoma in an other family member; Ovarian cancer in his maternal grandmother and another family member.    Social History     Tobacco Use   • Smoking status: Former Smoker     Quit date: 10/3/2017     Years since quittin.3   • Smokeless tobacco: Never Used   Substance Use Topics   • Alcohol use: No   • Drug use: No     HPI, ROS and PFSH have been reviewed and confirmed on 2022.     SUBJECTIVE:    Patient presents to the clinic for follow-up appointment.  He denies new issues or changes to his health care.  He denies any bleeding problems.  He remains on Eliquis 2.5 mg p.o. twice a day.      Patient does not have any specific complaints.    REVIEW OF SYSTEMS:    Review of Systems   Constitutional: Negative for chills, fatigue and fever.   HENT: Negative for mouth sores and nosebleeds.    Respiratory: Negative for chest tightness and shortness of breath.    Cardiovascular: Negative for chest pain and leg swelling.   Gastrointestinal: Negative for blood  "in stool, diarrhea, nausea and vomiting.   Genitourinary: Negative for hematuria.   Musculoskeletal: Negative for arthralgias and myalgias.   Skin: Negative for rash and wound.   Neurological: Negative for dizziness and headaches.   Hematological: Does not bruise/bleed easily.   Psychiatric/Behavioral: Negative for confusion.     OBJECTIVE:    Vitals:    02/22/22 1351   BP: 121/81   Pulse: 89   Resp: 20   Temp: 97.5 °F (36.4 °C)   Weight: 120 kg (265 lb 3.2 oz)   Height: 175.3 cm (69\")   PainSc: 0-No pain       ECOG    (0) Fully active, able to carry on all predisease performance without restriction    Physical Exam   Constitutional: He is oriented to person, place, and time.  Non-toxic appearance. He does not appear ill. No distress.   HENT:   Head: Normocephalic and atraumatic.   Eyes: Conjunctivae are normal. Right eye exhibits no discharge. No scleral icterus.   Cardiovascular: Normal rate, regular rhythm and normal heart sounds.   Pulmonary/Chest: Effort normal and breath sounds normal. No respiratory distress. He has no wheezes.   Abdominal: Soft. Normal appearance and bowel sounds are normal. He exhibits no distension. There is no abdominal tenderness.   Musculoskeletal: Normal range of motion.      Comments: Mild left lower extremity swelling, no erythema noted, non-pitting   Lymphadenopathy:     He has no cervical adenopathy.   Neurological: He is alert and oriented to person, place, and time.   Skin: Skin is warm. No bruising noted. He is not diaphoretic. No jaundice.   Psychiatric: His behavior is normal. Mood normal.   Nursing note and vitals reviewed.  I have reexamined the patient and the results are consistent with the previously documented exam. Earline Bowman MD     RECENT LABS    WBC   Date Value Ref Range Status   02/22/2022 9.00 3.40 - 10.80 10*3/mm3 Final     RBC   Date Value Ref Range Status   02/22/2022 4.77 4.14 - 5.80 10*6/mm3 Final     Hemoglobin   Date Value Ref Range Status "   02/22/2022 14.4 13.0 - 17.7 g/dL Final     Hematocrit   Date Value Ref Range Status   02/22/2022 44.0 37.5 - 51.0 % Final     MCV   Date Value Ref Range Status   02/22/2022 92.2 79.0 - 97.0 fL Final     MCH   Date Value Ref Range Status   02/22/2022 30.2 26.6 - 33.0 pg Final     MCHC   Date Value Ref Range Status   02/22/2022 32.7 31.5 - 35.7 g/dL Final     RDW   Date Value Ref Range Status   02/22/2022 12.8 12.3 - 15.4 % Final     RDW-SD   Date Value Ref Range Status   02/22/2022 41.8 37.0 - 54.0 fl Final     MPV   Date Value Ref Range Status   02/22/2022 12.0 6.0 - 12.0 fL Final     Platelets   Date Value Ref Range Status   02/22/2022 258 140 - 450 10*3/mm3 Final     Neutrophil %   Date Value Ref Range Status   02/22/2022 69.6 42.7 - 76.0 % Final     Lymphocyte %   Date Value Ref Range Status   02/22/2022 20.6 19.6 - 45.3 % Final     Monocyte %   Date Value Ref Range Status   02/22/2022 7.2 5.0 - 12.0 % Final     Eosinophil %   Date Value Ref Range Status   02/22/2022 2.3 0.3 - 6.2 % Final     Basophil %   Date Value Ref Range Status   02/22/2022 0.3 0.0 - 1.5 % Final     Neutrophils, Absolute   Date Value Ref Range Status   02/22/2022 6.26 1.70 - 7.00 10*3/mm3 Final     Lymphocytes, Absolute   Date Value Ref Range Status   02/22/2022 1.85 0.70 - 3.10 10*3/mm3 Final     Monocytes, Absolute   Date Value Ref Range Status   02/22/2022 0.65 0.10 - 0.90 10*3/mm3 Final     Eosinophils, Absolute   Date Value Ref Range Status   02/22/2022 0.21 0.00 - 0.40 10*3/mm3 Final     Basophils, Absolute   Date Value Ref Range Status   02/22/2022 0.03 0.00 - 0.20 10*3/mm3 Final     nRBC   Date Value Ref Range Status   01/14/2019 0 0 /100[WBCs] Final       Lab Results   Component Value Date    GLUCOSE 98 02/23/2021    BUN 14 02/23/2021    CREATININE 1.02 02/23/2021    EGFRIFNONA 82 02/23/2021    BCR 13.7 02/23/2021    K 4.1 02/23/2021    CO2 26.0 02/23/2021    CALCIUM 9.5 02/23/2021    ALBUMIN 4.20 02/23/2021    LABIL2 1.3  03/28/2019    AST 14 02/23/2021    ALT 17 02/23/2021     Assessment/Plan     Thrombophilia (HCC)  - CBC & Differential    ASSESSMENT:    1. Chronic deep venous thrombosis involving the left lower extremity   2. History of positive lupus anticoagulant: Ongoing management  3. Anticoagulation management: On Eliquis 2.5 mg p.o. twice daily  4. Patient has strong family history of cancer on the maternal side of the family.  His mother’s comprehensive gene test was negative.  Patient does not need genetic testing at this time unless there is family history of cancer on the paternal side.     Assessment has been reviewed    PLANS:    1. CBC reviewed  2. CMP today  3. Refill Eliquis  4. Continue anticoagulation therapy to Eliquis 2.5 mg p.o. twice daily  5. Educated patient on signs and symptoms of acute clots  6. Call for any new or unusual bleeding or bruising  7. Patient to call should he be scheduled for any surgical procedures in the near future  8. Follow-up in 6 months     I have reviewed lab results, imaging, vitals, and medications with the patient today.  Patient verbalized understanding and is in agreement with the above plan of care.

## 2022-02-22 ENCOUNTER — OFFICE VISIT (OUTPATIENT)
Dept: ONCOLOGY | Facility: CLINIC | Age: 42
End: 2022-02-22

## 2022-02-22 ENCOUNTER — LAB (OUTPATIENT)
Dept: LAB | Facility: HOSPITAL | Age: 42
End: 2022-02-22

## 2022-02-22 VITALS
DIASTOLIC BLOOD PRESSURE: 81 MMHG | HEART RATE: 89 BPM | SYSTOLIC BLOOD PRESSURE: 121 MMHG | TEMPERATURE: 97.5 F | BODY MASS INDEX: 39.28 KG/M2 | RESPIRATION RATE: 20 BRPM | WEIGHT: 265.2 LBS | HEIGHT: 69 IN

## 2022-02-22 DIAGNOSIS — D68.59 THROMBOPHILIA: Primary | ICD-10-CM

## 2022-02-22 LAB
BASOPHILS # BLD AUTO: 0.03 10*3/MM3 (ref 0–0.2)
BASOPHILS NFR BLD AUTO: 0.3 % (ref 0–1.5)
DEPRECATED RDW RBC AUTO: 41.8 FL (ref 37–54)
EOSINOPHIL # BLD AUTO: 0.21 10*3/MM3 (ref 0–0.4)
EOSINOPHIL NFR BLD AUTO: 2.3 % (ref 0.3–6.2)
ERYTHROCYTE [DISTWIDTH] IN BLOOD BY AUTOMATED COUNT: 12.8 % (ref 12.3–15.4)
HCT VFR BLD AUTO: 44 % (ref 37.5–51)
HGB BLD-MCNC: 14.4 G/DL (ref 13–17.7)
HOLD SPECIMEN: NORMAL
HOLD SPECIMEN: NORMAL
LYMPHOCYTES # BLD AUTO: 1.85 10*3/MM3 (ref 0.7–3.1)
LYMPHOCYTES NFR BLD AUTO: 20.6 % (ref 19.6–45.3)
MCH RBC QN AUTO: 30.2 PG (ref 26.6–33)
MCHC RBC AUTO-ENTMCNC: 32.7 G/DL (ref 31.5–35.7)
MCV RBC AUTO: 92.2 FL (ref 79–97)
MONOCYTES # BLD AUTO: 0.65 10*3/MM3 (ref 0.1–0.9)
MONOCYTES NFR BLD AUTO: 7.2 % (ref 5–12)
NEUTROPHILS NFR BLD AUTO: 6.26 10*3/MM3 (ref 1.7–7)
NEUTROPHILS NFR BLD AUTO: 69.6 % (ref 42.7–76)
PLATELET # BLD AUTO: 258 10*3/MM3 (ref 140–450)
PMV BLD AUTO: 12 FL (ref 6–12)
RBC # BLD AUTO: 4.77 10*6/MM3 (ref 4.14–5.8)
WBC NRBC COR # BLD: 9 10*3/MM3 (ref 3.4–10.8)

## 2022-02-22 PROCEDURE — 80053 COMPREHEN METABOLIC PANEL: CPT

## 2022-02-22 PROCEDURE — 36415 COLL VENOUS BLD VENIPUNCTURE: CPT

## 2022-02-22 PROCEDURE — 99213 OFFICE O/P EST LOW 20 MIN: CPT | Performed by: INTERNAL MEDICINE

## 2022-02-22 PROCEDURE — 85025 COMPLETE CBC W/AUTO DIFF WBC: CPT | Performed by: INTERNAL MEDICINE

## 2022-02-22 RX ORDER — TRIHEXYPHENIDYL HYDROCHLORIDE 5 MG/1
TABLET ORAL
COMMUNITY
Start: 2022-02-16

## 2022-02-23 LAB
ALBUMIN SERPL-MCNC: 4.3 G/DL (ref 3.5–5.2)
ALBUMIN/GLOB SERPL: 1.6 G/DL
ALP SERPL-CCNC: 71 U/L (ref 39–117)
ALT SERPL W P-5'-P-CCNC: 26 U/L (ref 1–41)
ANION GAP SERPL CALCULATED.3IONS-SCNC: 10 MMOL/L (ref 5–15)
AST SERPL-CCNC: 21 U/L (ref 1–40)
BILIRUB SERPL-MCNC: 0.5 MG/DL (ref 0–1.2)
BUN SERPL-MCNC: 11 MG/DL (ref 6–20)
BUN/CREAT SERPL: 10.4 (ref 7–25)
CALCIUM SPEC-SCNC: 9.5 MG/DL (ref 8.6–10.5)
CHLORIDE SERPL-SCNC: 106 MMOL/L (ref 98–107)
CO2 SERPL-SCNC: 25 MMOL/L (ref 22–29)
CREAT SERPL-MCNC: 1.06 MG/DL (ref 0.76–1.27)
GFR SERPL CREATININE-BSD FRML MDRD: 78 ML/MIN/1.73
GLOBULIN UR ELPH-MCNC: 2.7 GM/DL
GLUCOSE SERPL-MCNC: 90 MG/DL (ref 65–99)
POTASSIUM SERPL-SCNC: 4.4 MMOL/L (ref 3.5–5.2)
PROT SERPL-MCNC: 7 G/DL (ref 6–8.5)
SODIUM SERPL-SCNC: 141 MMOL/L (ref 136–145)

## 2022-08-22 ENCOUNTER — APPOINTMENT (OUTPATIENT)
Dept: LAB | Facility: HOSPITAL | Age: 42
End: 2022-08-22

## 2022-09-06 ENCOUNTER — APPOINTMENT (OUTPATIENT)
Dept: LAB | Facility: HOSPITAL | Age: 42
End: 2022-09-06

## 2022-09-06 ENCOUNTER — OFFICE VISIT (OUTPATIENT)
Dept: ONCOLOGY | Facility: CLINIC | Age: 42
End: 2022-09-06

## 2022-09-06 VITALS
SYSTOLIC BLOOD PRESSURE: 123 MMHG | WEIGHT: 249 LBS | HEIGHT: 69 IN | OXYGEN SATURATION: 96 % | DIASTOLIC BLOOD PRESSURE: 81 MMHG | HEART RATE: 74 BPM | BODY MASS INDEX: 36.88 KG/M2 | TEMPERATURE: 97.1 F

## 2022-09-06 DIAGNOSIS — D68.59 THROMBOPHILIA: Primary | ICD-10-CM

## 2022-09-06 LAB
ALBUMIN SERPL-MCNC: 4.7 G/DL (ref 3.5–5.2)
ALBUMIN/GLOB SERPL: 1.8 G/DL
ALP SERPL-CCNC: 78 U/L (ref 39–117)
ALT SERPL W P-5'-P-CCNC: 23 U/L (ref 1–41)
ANION GAP SERPL CALCULATED.3IONS-SCNC: 9 MMOL/L (ref 5–15)
AST SERPL-CCNC: 17 U/L (ref 1–40)
BASOPHILS # BLD AUTO: 0.03 10*3/MM3 (ref 0–0.2)
BASOPHILS NFR BLD AUTO: 0.4 % (ref 0–1.5)
BILIRUB SERPL-MCNC: 1.7 MG/DL (ref 0–1.2)
BUN SERPL-MCNC: 12 MG/DL (ref 6–20)
BUN/CREAT SERPL: 11.7 (ref 7–25)
CALCIUM SPEC-SCNC: 9.7 MG/DL (ref 8.6–10.5)
CHLORIDE SERPL-SCNC: 105 MMOL/L (ref 98–107)
CO2 SERPL-SCNC: 27 MMOL/L (ref 22–29)
CREAT SERPL-MCNC: 1.03 MG/DL (ref 0.76–1.27)
DEPRECATED RDW RBC AUTO: 39.4 FL (ref 37–54)
EGFRCR SERPLBLD CKD-EPI 2021: 94.8 ML/MIN/1.73
EOSINOPHIL # BLD AUTO: 0.13 10*3/MM3 (ref 0–0.4)
EOSINOPHIL NFR BLD AUTO: 1.6 % (ref 0.3–6.2)
ERYTHROCYTE [DISTWIDTH] IN BLOOD BY AUTOMATED COUNT: 12 % (ref 12.3–15.4)
GLOBULIN UR ELPH-MCNC: 2.6 GM/DL
GLUCOSE SERPL-MCNC: 96 MG/DL (ref 65–99)
HCT VFR BLD AUTO: 45.1 % (ref 37.5–51)
HGB BLD-MCNC: 15.2 G/DL (ref 13–17.7)
HOLD SPECIMEN: NORMAL
LYMPHOCYTES # BLD AUTO: 1.91 10*3/MM3 (ref 0.7–3.1)
LYMPHOCYTES NFR BLD AUTO: 23.5 % (ref 19.6–45.3)
MCH RBC QN AUTO: 30.8 PG (ref 26.6–33)
MCHC RBC AUTO-ENTMCNC: 33.7 G/DL (ref 31.5–35.7)
MCV RBC AUTO: 91.5 FL (ref 79–97)
MONOCYTES # BLD AUTO: 0.52 10*3/MM3 (ref 0.1–0.9)
MONOCYTES NFR BLD AUTO: 6.4 % (ref 5–12)
NEUTROPHILS NFR BLD AUTO: 5.55 10*3/MM3 (ref 1.7–7)
NEUTROPHILS NFR BLD AUTO: 68.1 % (ref 42.7–76)
PLATELET # BLD AUTO: 264 10*3/MM3 (ref 140–450)
PMV BLD AUTO: 12.2 FL (ref 6–12)
POTASSIUM SERPL-SCNC: 4.8 MMOL/L (ref 3.5–5.2)
PROT SERPL-MCNC: 7.3 G/DL (ref 6–8.5)
RBC # BLD AUTO: 4.93 10*6/MM3 (ref 4.14–5.8)
SODIUM SERPL-SCNC: 141 MMOL/L (ref 136–145)
WBC NRBC COR # BLD: 8.14 10*3/MM3 (ref 3.4–10.8)

## 2022-09-06 PROCEDURE — 80053 COMPREHEN METABOLIC PANEL: CPT | Performed by: NURSE PRACTITIONER

## 2022-09-06 PROCEDURE — 85025 COMPLETE CBC W/AUTO DIFF WBC: CPT | Performed by: NURSE PRACTITIONER

## 2022-09-06 PROCEDURE — 36415 COLL VENOUS BLD VENIPUNCTURE: CPT | Performed by: NURSE PRACTITIONER

## 2022-09-06 PROCEDURE — 99213 OFFICE O/P EST LOW 20 MIN: CPT | Performed by: NURSE PRACTITIONER

## 2022-09-06 RX ORDER — DIVALPROEX SODIUM 250 MG/1
TABLET, EXTENDED RELEASE ORAL
COMMUNITY
Start: 2022-08-18

## 2022-09-06 RX ORDER — DEUTETRABENAZINE 12 MG/1
TABLET, COATED ORAL
COMMUNITY
Start: 2022-08-07

## 2022-09-06 RX ORDER — LITHIUM CARBONATE 450 MG
450 TABLET, EXTENDED RELEASE ORAL DAILY
COMMUNITY
Start: 2022-08-08

## 2022-09-06 NOTE — PROGRESS NOTES
Hematology/Oncology Outpatient Follow Up    PATIENT NAME:Jesus Martínez  :1980  MRN: [id number]  PRIMARY CARE PHYSICIAN: Casimiro Adams MD  REFERRING PHYSICIAN: Casimiro Adams,*    Chief Complaint   Patient presents with   • Follow-up     Thrombophilia        HISTORY OF PRESENT ILLNESS:     This is a 39-year-old male who developed DVT involving the left lower extremity back in .  At the time patient was seen by Dr. Hrading, was diagnosed with positive lupus anticoagulant and has since then been on Eliquis.  He has had chronic pain, swelling involving the left lower extremity.  He has had several Doppler studies done to rule out an acute DVT.  The first Doppler done in 2015 revealed patient had thrombosis in the popliteal vein extending into the posterior tibial veins on the left side.  On 16 due to symptoms of pain and swelling, he had a repeat Doppler study which showed findings of chronic venous thrombosis of the left lower extremity deep venous system involving the left superficial femoral vein and popliteal vein with recanalization and formation of collaterals in the thigh.  Most recently on 16 patient was seen in the emergency room for chronic pain, swelling involving the left lower extremity.  Doppler done showed chronic deep venous thrombosis of the left superficial femoral vein and popliteal vein.  Patient is here today to have further discussions why his clot has not resolved if the Eliquis is working.  He denies chest pain, shortness of breath.  He has no prior history of thrombosis elsewhere in the past.  He wa accompanied by his mother for the appointment on 16.     • Since his last visit I have had more records released from Dr. Harding’s office.  On 12/1/15 apparently patient had hypercoagulable work up which was negative for protein C, factor V Leiden, prothrombin gene mutation, anticardiolipin antibodies and beta-2 glycoprotein antibody, but  positive for lupus anticoagulant.  He had a repeat lupus anticoagulant on 3/16/16 which was negative.    • 12/13/16 - Negative mixing study for inhibitor effect in PTT.  Lupus inhibitor was not identified.    • 2/28/28 - Doppler ultrasound of the left lower extremity due to complaint of swelling.  This came back negative for acute DVT, but he does have chronic DVT in the left femoral vein.    • Patient’s comprehensive genetic analysis with [employer] was negative for any significant mutation in his mother.   • 7/3/18 - CMP:  BUN 10.  Creatinine 1.  Liver function tests, normal.   • 6/6/2020: Anticardiolipin antibodies normal, beta-2 glycoprotein antibodies normal, lupus anticoagulation not detected, but dRVVT 64.5.     HPI, ROS and PFSH have been reviewed and confirmed on 9/6/2022.     Past Medical History:   Diagnosis Date   • Anxiety    • Bipolar disorder (manic depression) (HCC)    • Chronic deep vein thrombosis (DVT) (HCC)    • GERD (gastroesophageal reflux disease)        Past Surgical History:   Procedure Laterality Date   • APPENDECTOMY     • EAR TUBES           Current Outpatient Medications:   •  Allergy Relief 10 MG tablet, Take 10 mg by mouth Daily., Disp: , Rfl:   •  apixaban (ELIQUIS) 2.5 MG tablet tablet, Take 1 tablet by mouth Every 12 (Twelve) Hours., Disp: 60 tablet, Rfl: 5  •  atorvastatin (LIPITOR) 10 MG tablet, Take 10 mg by mouth every night at bedtime., Disp: , Rfl:   •  Austedo 12 MG tablet, , Disp: , Rfl:   •  dexlansoprazole (DEXILANT) 60 MG capsule, Take 1 capsule by mouth Daily., Disp: 30 capsule, Rfl: 2  •  divalproex (DEPAKOTE ER) 250 MG 24 hr tablet, , Disp: , Rfl:   •  escitalopram (LEXAPRO) 10 MG tablet, Take 10 mg by mouth Daily., Disp: , Rfl: 3  •  fluticasone (FLONASE) 50 MCG/ACT nasal spray, FLONASE 50 MCG/ACT NASAL SUSPENSION, Disp: , Rfl:   •  levothyroxine (SYNTHROID, LEVOTHROID) 50 MCG tablet, Take 50 mcg by mouth Daily., Disp: , Rfl:   •  lithium (ESKALITH) 450 MG  CR tablet, Take 450 mg by mouth Daily., Disp: , Rfl:   •  lithium (LITHOBID) 300 MG CR tablet, Take 300 mg by mouth Daily., Disp: , Rfl:   •  Loratadine 10 MG capsule, Take  by mouth., Disp: , Rfl:   •  propranolol (INDERAL) 20 MG tablet, Take 20 mg by mouth 2 (Two) Times a Day., Disp: , Rfl:   •  trihexyphenidyl (ARTANE) 5 MG tablet, , Disp: , Rfl:   •  Vraylar 1.5 MG capsule capsule, Take 1.5 mg by mouth Daily., Disp: , Rfl:   •  divalproex (DEPAKOTE) 500 MG 24 hr tablet, Take 1,500 mg by mouth Daily., Disp: , Rfl: 0    Allergies   Allergen Reactions   • Aripiprazole Rash and Swelling   • Cephalexin Rash   • Penicillins Rash   • Risperidone Unknown (See Comments)       Family History   Problem Relation Age of Onset   • Colon cancer Maternal Grandmother 55   • Ovarian cancer Maternal Grandmother    • Basal cell carcinoma Maternal Grandmother         skin   • Kidney cancer Maternal Grandfather 51   • Leukemia Other    • Ovarian cancer Other    • Melanoma Other        Cancer-related family history includes Colon cancer (age of onset: 55) in his maternal grandmother; Kidney cancer (age of onset: 51) in his maternal grandfather; Melanoma in an other family member; Ovarian cancer in his maternal grandmother and another family member.    Social History     Tobacco Use   • Smoking status: Former Smoker     Quit date: 10/3/2017     Years since quittin.9   • Smokeless tobacco: Never Used   Substance Use Topics   • Alcohol use: No   • Drug use: No     HPI, ROS and PFSH have been reviewed and confirmed on 2022.     SUBJECTIVE:  Patient presents to the clinic for follow-up appointment. No s/s of bleeding and no upcoming procedures.   Patient does not have any specific complaints.    REVIEW OF SYSTEMS:    Review of Systems   Constitutional: Negative for chills, fatigue and fever.   HENT: Negative for mouth sores and nosebleeds.    Respiratory: Negative for chest tightness and shortness of breath.    Cardiovascular:  "Negative for chest pain and leg swelling.   Gastrointestinal: Negative for blood in stool, diarrhea, nausea and vomiting.   Genitourinary: Negative for hematuria.   Musculoskeletal: Negative for arthralgias and myalgias.   Skin: Negative for rash and wound.   Neurological: Negative for dizziness and headaches.   Hematological: Does not bruise/bleed easily.   Psychiatric/Behavioral: Negative for confusion.     OBJECTIVE:    Vitals:    09/06/22 1329   BP: 123/81   Pulse: 74   Temp: 97.1 °F (36.2 °C)   SpO2: 96%   Weight: 113 kg (249 lb)  Comment: verbal   Height: 175.3 cm (69\")   PainSc: 0-No pain       ECOG    (0) Fully active, able to carry on all predisease performance without restriction    Physical Exam   Constitutional: He is oriented to person, place, and time.  Non-toxic appearance. He does not appear ill. No distress.   HENT:   Head: Normocephalic and atraumatic.   Eyes: Conjunctivae are normal. Right eye exhibits no discharge. No scleral icterus.   Cardiovascular: Normal rate, regular rhythm and normal heart sounds.   Pulmonary/Chest: Effort normal and breath sounds normal. No respiratory distress. He has no wheezes.   Abdominal: Soft. Normal appearance and bowel sounds are normal. He exhibits no distension. There is no abdominal tenderness.   Musculoskeletal: Normal range of motion.   Lymphadenopathy:     He has no cervical adenopathy.   Neurological: He is alert and oriented to person, place, and time.   Skin: Skin is warm. No bruising noted. He is not diaphoretic. No jaundice.   Psychiatric: His behavior is normal. Mood normal.   Nursing note and vitals reviewed.  I have reexamined the patient and the results are consistent with the previously documented exam. Milvia Olson, LUCIANA     RECENT LABS    WBC   Date Value Ref Range Status   09/06/2022 8.14 3.40 - 10.80 10*3/mm3 Final     RBC   Date Value Ref Range Status   09/06/2022 4.93 4.14 - 5.80 10*6/mm3 Final     Hemoglobin   Date Value Ref Range " Status   09/06/2022 15.2 13.0 - 17.7 g/dL Final     Hematocrit   Date Value Ref Range Status   09/06/2022 45.1 37.5 - 51.0 % Final     MCV   Date Value Ref Range Status   09/06/2022 91.5 79.0 - 97.0 fL Final     MCH   Date Value Ref Range Status   09/06/2022 30.8 26.6 - 33.0 pg Final     MCHC   Date Value Ref Range Status   09/06/2022 33.7 31.5 - 35.7 g/dL Final     RDW   Date Value Ref Range Status   09/06/2022 12.0 (L) 12.3 - 15.4 % Final     RDW-SD   Date Value Ref Range Status   09/06/2022 39.4 37.0 - 54.0 fl Final     MPV   Date Value Ref Range Status   09/06/2022 12.2 (H) 6.0 - 12.0 fL Final     Platelets   Date Value Ref Range Status   09/06/2022 264 140 - 450 10*3/mm3 Final     Neutrophil %   Date Value Ref Range Status   09/06/2022 68.1 42.7 - 76.0 % Final     Lymphocyte %   Date Value Ref Range Status   09/06/2022 23.5 19.6 - 45.3 % Final     Monocyte %   Date Value Ref Range Status   09/06/2022 6.4 5.0 - 12.0 % Final     Eosinophil %   Date Value Ref Range Status   09/06/2022 1.6 0.3 - 6.2 % Final     Basophil %   Date Value Ref Range Status   09/06/2022 0.4 0.0 - 1.5 % Final     Neutrophils, Absolute   Date Value Ref Range Status   09/06/2022 5.55 1.70 - 7.00 10*3/mm3 Final     Lymphocytes, Absolute   Date Value Ref Range Status   09/06/2022 1.91 0.70 - 3.10 10*3/mm3 Final     Monocytes, Absolute   Date Value Ref Range Status   09/06/2022 0.52 0.10 - 0.90 10*3/mm3 Final     Eosinophils, Absolute   Date Value Ref Range Status   09/06/2022 0.13 0.00 - 0.40 10*3/mm3 Final     Basophils, Absolute   Date Value Ref Range Status   09/06/2022 0.03 0.00 - 0.20 10*3/mm3 Final     nRBC   Date Value Ref Range Status   01/14/2019 0 0 /100[WBCs] Final       Lab Results   Component Value Date    GLUCOSE 90 02/22/2022    BUN 11 02/22/2022    CREATININE 1.06 02/22/2022    EGFRIFNONA 78 02/22/2022    BCR 10.4 02/22/2022    K 4.4 02/22/2022    CO2 25.0 02/22/2022    CALCIUM 9.5 02/22/2022    ALBUMIN 4.30 02/22/2022     LABIL2 1.3 03/28/2019    AST 21 02/22/2022    ALT 26 02/22/2022     Assessment & Plan     Thrombophilia (HCC)  - CBC & Differential  - Comprehensive Metabolic Panel    ASSESSMENT:    1. Chronic deep venous thrombosis involving the left lower extremity   2. History of positive lupus anticoagulant: Ongoing management  3. Anticoagulation management: On Eliquis 2.5 mg p.o. twice daily  4. Patient has strong family history of cancer on the maternal side of the family.  His mother’s comprehensive gene test was negative.  Patient does not need genetic testing at this time unless there is family history of cancer on the paternal side.     Assessment has been reviewed    PLANS:    1. CBC reviewed with patient  2. CMP today  3. Refill Eliquis  4. Continue anticoagulation therapy to Eliquis 2.5 mg p.o. twice daily  5. Educated patient on signs and symptoms of acute clots  6. Call for any new or unusual bleeding or bruising  7. Patient to call should he be scheduled for any surgical procedures in the near future  8. Follow-up in 6 months with Dr. Bowman and as needed     I have reviewed lab results, imaging, vitals, and medications with the patient today.  Patient verbalized understanding and is in agreement with the above plan of care.

## 2023-02-24 DIAGNOSIS — D68.59 THROMBOPHILIA: ICD-10-CM

## 2023-03-07 ENCOUNTER — OFFICE VISIT (OUTPATIENT)
Dept: ONCOLOGY | Facility: CLINIC | Age: 43
End: 2023-03-07
Payer: MEDICAID

## 2023-03-07 ENCOUNTER — LAB (OUTPATIENT)
Dept: LAB | Facility: HOSPITAL | Age: 43
End: 2023-03-07
Payer: MEDICAID

## 2023-03-07 VITALS
SYSTOLIC BLOOD PRESSURE: 122 MMHG | DIASTOLIC BLOOD PRESSURE: 80 MMHG | HEIGHT: 69 IN | RESPIRATION RATE: 20 BRPM | WEIGHT: 256 LBS | HEART RATE: 74 BPM | TEMPERATURE: 97 F | BODY MASS INDEX: 37.92 KG/M2

## 2023-03-07 DIAGNOSIS — D68.59 THROMBOPHILIA: Primary | ICD-10-CM

## 2023-03-07 LAB
ALBUMIN SERPL-MCNC: 4.5 G/DL (ref 3.5–5.2)
ALBUMIN/GLOB SERPL: 1.4 G/DL
ALP SERPL-CCNC: 98 U/L (ref 39–117)
ALT SERPL W P-5'-P-CCNC: 47 U/L (ref 1–41)
ANION GAP SERPL CALCULATED.3IONS-SCNC: 9 MMOL/L (ref 5–15)
AST SERPL-CCNC: 26 U/L (ref 1–40)
BASOPHILS # BLD AUTO: 0.04 10*3/MM3 (ref 0–0.2)
BASOPHILS NFR BLD AUTO: 0.4 % (ref 0–1.5)
BILIRUB SERPL-MCNC: 1 MG/DL (ref 0–1.2)
BUN SERPL-MCNC: 19 MG/DL (ref 6–20)
BUN/CREAT SERPL: 18.6 (ref 7–25)
CALCIUM SPEC-SCNC: 9.9 MG/DL (ref 8.6–10.5)
CHLORIDE SERPL-SCNC: 102 MMOL/L (ref 98–107)
CO2 SERPL-SCNC: 29 MMOL/L (ref 22–29)
CREAT SERPL-MCNC: 1.02 MG/DL (ref 0.76–1.27)
DEPRECATED RDW RBC AUTO: 40.2 FL (ref 37–54)
EGFRCR SERPLBLD CKD-EPI 2021: 95.3 ML/MIN/1.73
EOSINOPHIL # BLD AUTO: 0.2 10*3/MM3 (ref 0–0.4)
EOSINOPHIL NFR BLD AUTO: 1.9 % (ref 0.3–6.2)
ERYTHROCYTE [DISTWIDTH] IN BLOOD BY AUTOMATED COUNT: 12.6 % (ref 12.3–15.4)
GLOBULIN UR ELPH-MCNC: 3.2 GM/DL
GLUCOSE SERPL-MCNC: 98 MG/DL (ref 65–99)
HCT VFR BLD AUTO: 46.7 % (ref 37.5–51)
HGB BLD-MCNC: 15.8 G/DL (ref 13–17.7)
HOLD SPECIMEN: NORMAL
HOLD SPECIMEN: NORMAL
LYMPHOCYTES # BLD AUTO: 2.22 10*3/MM3 (ref 0.7–3.1)
LYMPHOCYTES NFR BLD AUTO: 20.8 % (ref 19.6–45.3)
MCH RBC QN AUTO: 29.7 PG (ref 26.6–33)
MCHC RBC AUTO-ENTMCNC: 33.8 G/DL (ref 31.5–35.7)
MCV RBC AUTO: 87.8 FL (ref 79–97)
MONOCYTES # BLD AUTO: 0.5 10*3/MM3 (ref 0.1–0.9)
MONOCYTES NFR BLD AUTO: 4.7 % (ref 5–12)
NEUTROPHILS NFR BLD AUTO: 7.71 10*3/MM3 (ref 1.7–7)
NEUTROPHILS NFR BLD AUTO: 72.2 % (ref 42.7–76)
PLATELET # BLD AUTO: 300 10*3/MM3 (ref 140–450)
PMV BLD AUTO: 12 FL (ref 6–12)
POTASSIUM SERPL-SCNC: 4.5 MMOL/L (ref 3.5–5.2)
PROT SERPL-MCNC: 7.7 G/DL (ref 6–8.5)
RBC # BLD AUTO: 5.32 10*6/MM3 (ref 4.14–5.8)
SODIUM SERPL-SCNC: 140 MMOL/L (ref 136–145)
WBC NRBC COR # BLD: 10.67 10*3/MM3 (ref 3.4–10.8)

## 2023-03-07 PROCEDURE — 80053 COMPREHEN METABOLIC PANEL: CPT | Performed by: INTERNAL MEDICINE

## 2023-03-07 PROCEDURE — 99213 OFFICE O/P EST LOW 20 MIN: CPT | Performed by: INTERNAL MEDICINE

## 2023-03-07 PROCEDURE — 85025 COMPLETE CBC W/AUTO DIFF WBC: CPT

## 2023-03-07 PROCEDURE — 36415 COLL VENOUS BLD VENIPUNCTURE: CPT

## 2023-03-07 RX ORDER — BUSPIRONE HYDROCHLORIDE 7.5 MG/1
1 TABLET ORAL EVERY 12 HOURS SCHEDULED
COMMUNITY
Start: 2023-02-06

## 2023-03-07 RX ORDER — HYDROXYZINE PAMOATE 25 MG/1
CAPSULE ORAL
COMMUNITY
Start: 2023-03-06

## 2023-03-07 RX ORDER — BUSPIRONE HYDROCHLORIDE 5 MG/1
1 TABLET ORAL EVERY 12 HOURS SCHEDULED
COMMUNITY
Start: 2023-02-27

## 2023-03-07 NOTE — PROGRESS NOTES
Hematology/Oncology Outpatient Follow Up    PATIENT NAME:Jesus Martínez  :1980  MRN: [id number]  PRIMARY CARE PHYSICIAN: Casimiro Adams MD  REFERRING PHYSICIAN: Casimiro Adams,*    Chief Complaint   Patient presents with   • Follow-up     Thrombophilia (HCC)        HISTORY OF PRESENT ILLNESS:     This is a 42-year-old male who developed DVT involving the left lower extremity back in .  At the time patient was seen by Dr. Harding, was diagnosed with positive lupus anticoagulant and has since then been on Eliquis.  He has had chronic pain, swelling involving the left lower extremity.  He has had several Doppler studies done to rule out an acute DVT.  The first Doppler done in 2015 revealed patient had thrombosis in the popliteal vein extending into the posterior tibial veins on the left side.  On 16 due to symptoms of pain and swelling, he had a repeat Doppler study which showed findings of chronic venous thrombosis of the left lower extremity deep venous system involving the left superficial femoral vein and popliteal vein with recanalization and formation of collaterals in the thigh.  Most recently on 16 patient was seen in the emergency room for chronic pain, swelling involving the left lower extremity.  Doppler done showed chronic deep venous thrombosis of the left superficial femoral vein and popliteal vein.  Patient is here today to have further discussions why his clot has not resolved if the Eliquis is working.  He denies chest pain, shortness of breath.  He has no prior history of thrombosis elsewhere in the past.  He wa accompanied by his mother for the appointment on 16.     • Since his last visit I have had more records released from Dr. Harding’s office.  On 12/1/15 apparently patient had hypercoagulable work up which was negative for protein C, factor V Leiden, prothrombin gene mutation, anticardiolipin antibodies and beta-2 glycoprotein  antibody, but positive for lupus anticoagulant.  He had a repeat lupus anticoagulant on 3/16/16 which was negative.    • 12/13/16 - Negative mixing study for inhibitor effect in PTT.  Lupus inhibitor was not identified.    • 2/28/28 - Doppler ultrasound of the left lower extremity due to complaint of swelling.  This came back negative for acute DVT, but he does have chronic DVT in the left femoral vein.    • Patient’s comprehensive genetic analysis with [employer] technology was negative for any significant mutation in his mother.   • 7/3/18 - CMP:  BUN 10.  Creatinine 1.  Liver function tests, normal.   • 6/6/2020: Anticardiolipin antibodies normal, beta-2 glycoprotein antibodies normal, lupus anticoagulation not detected, but dRVVT 64.5.     HPI, ROS and PFSH have been reviewed and confirmed on 3/7/2023.     Past Medical History:   Diagnosis Date   • Anxiety    • Bipolar disorder (manic depression) (HCC)    • Chronic deep vein thrombosis (DVT) (HCC)    • GERD (gastroesophageal reflux disease)        Past Surgical History:   Procedure Laterality Date   • APPENDECTOMY     • EAR TUBES           Current Outpatient Medications:   •  Allergy Relief 10 MG tablet, Take 10 mg by mouth Daily., Disp: , Rfl:   •  apixaban (ELIQUIS) 5 MG tablet tablet, Take 1 tablet by mouth 2 (Two) Times a Day., Disp: 60 tablet, Rfl: 6  •  atorvastatin (LIPITOR) 10 MG tablet, Take 10 mg by mouth every night at bedtime., Disp: , Rfl:   •  Austedo 12 MG tablet, , Disp: , Rfl:   •  busPIRone (BUSPAR) 5 MG tablet, Take 1 tablet by mouth Every 12 (Twelve) Hours., Disp: , Rfl:   •  busPIRone (BUSPAR) 7.5 MG tablet, Take 1 tablet by mouth Every 12 (Twelve) Hours., Disp: , Rfl:   •  dexlansoprazole (DEXILANT) 60 MG capsule, Take 1 capsule by mouth Daily., Disp: 30 capsule, Rfl: 2  •  divalproex (DEPAKOTE ER) 250 MG 24 hr tablet, , Disp: , Rfl:   •  divalproex (DEPAKOTE) 500 MG 24 hr tablet, Take 1,500 mg by mouth Daily., Disp: , Rfl: 0  •   escitalopram (LEXAPRO) 10 MG tablet, Take 10 mg by mouth Daily., Disp: , Rfl: 3  •  fluticasone (FLONASE) 50 MCG/ACT nasal spray, FLONASE 50 MCG/ACT NASAL SUSPENSION, Disp: , Rfl:   •  hydrOXYzine pamoate (VISTARIL) 25 MG capsule, , Disp: , Rfl:   •  levothyroxine (SYNTHROID, LEVOTHROID) 50 MCG tablet, Take 50 mcg by mouth Daily., Disp: , Rfl:   •  lithium (ESKALITH) 450 MG CR tablet, Take 450 mg by mouth Daily., Disp: , Rfl:   •  lithium (LITHOBID) 300 MG CR tablet, Take 300 mg by mouth Daily., Disp: , Rfl:   •  Loratadine 10 MG capsule, Take  by mouth., Disp: , Rfl:   •  propranolol (INDERAL) 20 MG tablet, Take 20 mg by mouth 2 (Two) Times a Day., Disp: , Rfl:   •  trihexyphenidyl (ARTANE) 5 MG tablet, , Disp: , Rfl:   •  Vraylar 1.5 MG capsule capsule, Take 1.5 mg by mouth Daily., Disp: , Rfl:     Allergies   Allergen Reactions   • Aripiprazole Rash and Swelling   • Cephalexin Rash   • Penicillins Rash   • Risperidone Unknown (See Comments)       Family History   Problem Relation Age of Onset   • Colon cancer Maternal Grandmother 55   • Ovarian cancer Maternal Grandmother    • Basal cell carcinoma Maternal Grandmother         skin   • Kidney cancer Maternal Grandfather 51   • Leukemia Other    • Ovarian cancer Other    • Melanoma Other        Cancer-related family history includes Colon cancer (age of onset: 55) in his maternal grandmother; Kidney cancer (age of onset: 51) in his maternal grandfather; Melanoma in an other family member; Ovarian cancer in his maternal grandmother and another family member.    Social History     Tobacco Use   • Smoking status: Former     Types: Cigarettes     Quit date: 10/3/2017     Years since quittin.4   • Smokeless tobacco: Never   Substance Use Topics   • Alcohol use: No   • Drug use: No     I have reviewed and confirmed the accuracy of the patient's history: Chief complaint, HPI, ROS and Subjective as entered by the MA/LPN/RN. Earline Bowman MD 23  "    SUBJECTIVE:    Patient presents to the clinic for follow-up appointment. No s/s of bleeding and no upcoming procedures.     Patient does not have any new issues    REVIEW OF SYSTEMS:    Review of Systems   Constitutional: Negative for chills, fatigue and fever.   HENT: Negative for mouth sores and nosebleeds.    Respiratory: Negative for chest tightness and shortness of breath.    Cardiovascular: Negative for chest pain and leg swelling.   Gastrointestinal: Negative for blood in stool, diarrhea, nausea and vomiting.   Genitourinary: Negative for hematuria.   Musculoskeletal: Negative for arthralgias and myalgias.   Skin: Negative for rash and wound.   Neurological: Negative for dizziness and headaches.   Hematological: Does not bruise/bleed easily.   Psychiatric/Behavioral: Negative for confusion.     OBJECTIVE:    Vitals:    03/07/23 1301   BP: 122/80   Pulse: 74   Resp: 20   Temp: 97 °F (36.1 °C)   TempSrc: Infrared   Weight: 116 kg (256 lb)   Height: 175.3 cm (69\")   PainSc: 0-No pain       ECOG    (0) Fully active, able to carry on all predisease performance without restriction    Physical Exam   Constitutional: He is oriented to person, place, and time.  Non-toxic appearance. He does not appear ill. No distress.   HENT:   Head: Normocephalic and atraumatic.   Eyes: Conjunctivae are normal. Right eye exhibits no discharge. No scleral icterus.   Cardiovascular: Normal rate, regular rhythm and normal heart sounds.   Pulmonary/Chest: Effort normal and breath sounds normal. No respiratory distress. He has no wheezes.   Abdominal: Soft. Normal appearance and bowel sounds are normal. He exhibits no distension. There is no abdominal tenderness.   Musculoskeletal: Normal range of motion.   Lymphadenopathy:     He has no cervical adenopathy.   Neurological: He is alert and oriented to person, place, and time.   Skin: Skin is warm. No bruising noted. He is not diaphoretic. No jaundice.   Psychiatric: His behavior is " normal. Mood normal.   Nursing note and vitals reviewed.    I have reexamined the patient and the results are consistent with the previously documented exam. Earlinecasper Bowman MD     RECENT LABS    WBC   Date Value Ref Range Status   03/07/2023 10.67 3.40 - 10.80 10*3/mm3 Final     RBC   Date Value Ref Range Status   03/07/2023 5.32 4.14 - 5.80 10*6/mm3 Final     Hemoglobin   Date Value Ref Range Status   03/07/2023 15.8 13.0 - 17.7 g/dL Final     Hematocrit   Date Value Ref Range Status   03/07/2023 46.7 37.5 - 51.0 % Final     MCV   Date Value Ref Range Status   03/07/2023 87.8 79.0 - 97.0 fL Final     MCH   Date Value Ref Range Status   03/07/2023 29.7 26.6 - 33.0 pg Final     MCHC   Date Value Ref Range Status   03/07/2023 33.8 31.5 - 35.7 g/dL Final     RDW   Date Value Ref Range Status   03/07/2023 12.6 12.3 - 15.4 % Final     RDW-SD   Date Value Ref Range Status   03/07/2023 40.2 37.0 - 54.0 fl Final     MPV   Date Value Ref Range Status   03/07/2023 12.0 6.0 - 12.0 fL Final     Platelets   Date Value Ref Range Status   03/07/2023 300 140 - 450 10*3/mm3 Final     Neutrophil %   Date Value Ref Range Status   03/07/2023 72.2 42.7 - 76.0 % Final     Lymphocyte %   Date Value Ref Range Status   03/07/2023 20.8 19.6 - 45.3 % Final     Monocyte %   Date Value Ref Range Status   03/07/2023 4.7 (L) 5.0 - 12.0 % Final     Eosinophil %   Date Value Ref Range Status   03/07/2023 1.9 0.3 - 6.2 % Final     Basophil %   Date Value Ref Range Status   03/07/2023 0.4 0.0 - 1.5 % Final     Neutrophils, Absolute   Date Value Ref Range Status   03/07/2023 7.71 (H) 1.70 - 7.00 10*3/mm3 Final     Lymphocytes, Absolute   Date Value Ref Range Status   03/07/2023 2.22 0.70 - 3.10 10*3/mm3 Final     Monocytes, Absolute   Date Value Ref Range Status   03/07/2023 0.50 0.10 - 0.90 10*3/mm3 Final     Eosinophils, Absolute   Date Value Ref Range Status   03/07/2023 0.20 0.00 - 0.40 10*3/mm3 Final     Basophils, Absolute   Date Value  Ref Range Status   03/07/2023 0.04 0.00 - 0.20 10*3/mm3 Final     nRBC   Date Value Ref Range Status   01/14/2019 0 0 /100[WBCs] Final       Lab Results   Component Value Date    GLUCOSE 96 09/06/2022    BUN 12 09/06/2022    CREATININE 1.03 09/06/2022    EGFRIFNONA 78 02/22/2022    BCR 11.7 09/06/2022    K 4.8 09/06/2022    CO2 27.0 09/06/2022    CALCIUM 9.7 09/06/2022    ALBUMIN 4.70 09/06/2022    LABIL2 1.3 03/28/2019    AST 17 09/06/2022    ALT 23 09/06/2022     Assessment & Plan     Thrombophilia (HCC)  - CBC & Differential  - Comprehensive Metabolic Panel    ASSESSMENT:    1. Chronic deep venous thrombosis involving the left lower extremity .  Ongoing management  2. History of positive lupus anticoagulant: Ongoing management  3. Anticoagulation management: Continue Eliquis 2.5 mg p.o. twice daily  4. Patient has strong family history of cancer on the maternal side of the family.  His mother’s comprehensive gene test was negative.  Patient does not need genetic testing at this time unless there is family history of cancer on the paternal side.     Assessment has been reviewed    PLANS:    1. CBC today  2. CMP ordered  3. Refill Eliquis 2.5 mg twice a days #60 with refills  4. Educated patient on signs and symptoms of acute clots  5. Call for any new or unusual bleeding or bruising  6. Patient to call should he be scheduled for any surgical procedures in the near future  7. Follow-up in 6 months me or earlier as needed     I have reviewed lab results, imaging, vitals, and medications with the patient today.  Patient verbalized understanding and is in agreement with the above plan of care.      .

## 2023-09-07 ENCOUNTER — LAB (OUTPATIENT)
Dept: LAB | Facility: HOSPITAL | Age: 43
End: 2023-09-07
Payer: MEDICAID

## 2023-09-07 ENCOUNTER — OFFICE VISIT (OUTPATIENT)
Dept: ONCOLOGY | Facility: CLINIC | Age: 43
End: 2023-09-07
Payer: MEDICAID

## 2023-09-07 VITALS
SYSTOLIC BLOOD PRESSURE: 127 MMHG | TEMPERATURE: 98 F | DIASTOLIC BLOOD PRESSURE: 81 MMHG | RESPIRATION RATE: 18 BRPM | HEART RATE: 79 BPM | WEIGHT: 266 LBS | HEIGHT: 69 IN | OXYGEN SATURATION: 97 % | BODY MASS INDEX: 39.4 KG/M2

## 2023-09-07 DIAGNOSIS — D68.59 THROMBOPHILIA: Primary | ICD-10-CM

## 2023-09-07 LAB
ALBUMIN SERPL-MCNC: 4.9 G/DL (ref 3.5–5.2)
ALBUMIN/GLOB SERPL: 2.1 G/DL
ALP SERPL-CCNC: 78 U/L (ref 39–117)
ALT SERPL W P-5'-P-CCNC: 30 U/L (ref 1–41)
ANION GAP SERPL CALCULATED.3IONS-SCNC: 12 MMOL/L (ref 5–15)
AST SERPL-CCNC: 16 U/L (ref 1–40)
BASOPHILS # BLD AUTO: 0.02 10*3/MM3 (ref 0–0.2)
BASOPHILS NFR BLD AUTO: 0.2 % (ref 0–1.5)
BILIRUB SERPL-MCNC: 2.6 MG/DL (ref 0–1.2)
BUN SERPL-MCNC: 13 MG/DL (ref 6–20)
BUN/CREAT SERPL: 12.6 (ref 7–25)
CALCIUM SPEC-SCNC: 9.6 MG/DL (ref 8.6–10.5)
CHLORIDE SERPL-SCNC: 103 MMOL/L (ref 98–107)
CO2 SERPL-SCNC: 25 MMOL/L (ref 22–29)
CREAT SERPL-MCNC: 1.03 MG/DL (ref 0.76–1.27)
DEPRECATED RDW RBC AUTO: 41.4 FL (ref 37–54)
EGFRCR SERPLBLD CKD-EPI 2021: 94.2 ML/MIN/1.73
EOSINOPHIL # BLD AUTO: 0.14 10*3/MM3 (ref 0–0.4)
EOSINOPHIL NFR BLD AUTO: 1.4 % (ref 0.3–6.2)
ERYTHROCYTE [DISTWIDTH] IN BLOOD BY AUTOMATED COUNT: 12.7 % (ref 12.3–15.4)
GLOBULIN UR ELPH-MCNC: 2.3 GM/DL
GLUCOSE SERPL-MCNC: 112 MG/DL (ref 65–99)
HCT VFR BLD AUTO: 46.1 % (ref 37.5–51)
HGB BLD-MCNC: 15.3 G/DL (ref 13–17.7)
HOLD SPECIMEN: NORMAL
HOLD SPECIMEN: NORMAL
LYMPHOCYTES # BLD AUTO: 1.76 10*3/MM3 (ref 0.7–3.1)
LYMPHOCYTES NFR BLD AUTO: 17.5 % (ref 19.6–45.3)
MCH RBC QN AUTO: 29.4 PG (ref 26.6–33)
MCHC RBC AUTO-ENTMCNC: 33.2 G/DL (ref 31.5–35.7)
MCV RBC AUTO: 88.7 FL (ref 79–97)
MONOCYTES # BLD AUTO: 0.54 10*3/MM3 (ref 0.1–0.9)
MONOCYTES NFR BLD AUTO: 5.4 % (ref 5–12)
NEUTROPHILS NFR BLD AUTO: 7.58 10*3/MM3 (ref 1.7–7)
NEUTROPHILS NFR BLD AUTO: 75.5 % (ref 42.7–76)
PLATELET # BLD AUTO: 285 10*3/MM3 (ref 140–450)
PMV BLD AUTO: 12.1 FL (ref 6–12)
POTASSIUM SERPL-SCNC: 4.3 MMOL/L (ref 3.5–5.2)
PROT SERPL-MCNC: 7.2 G/DL (ref 6–8.5)
RBC # BLD AUTO: 5.2 10*6/MM3 (ref 4.14–5.8)
SODIUM SERPL-SCNC: 140 MMOL/L (ref 136–145)
WBC NRBC COR # BLD: 10.04 10*3/MM3 (ref 3.4–10.8)

## 2023-09-07 PROCEDURE — 80053 COMPREHEN METABOLIC PANEL: CPT | Performed by: INTERNAL MEDICINE

## 2023-09-07 PROCEDURE — 36415 COLL VENOUS BLD VENIPUNCTURE: CPT

## 2023-09-07 PROCEDURE — 85025 COMPLETE CBC W/AUTO DIFF WBC: CPT

## 2023-09-07 NOTE — PROGRESS NOTES
Hematology/Oncology Outpatient Follow Up    PATIENT NAME:Jesus Martínez  :1980  MRN: [id number]  PRIMARY CARE PHYSICIAN: Casimiro Adams MD  REFERRING PHYSICIAN: No ref. provider found    Chief Complaint   Patient presents with    Follow-up     Thrombophilia        HISTORY OF PRESENT ILLNESS:     This is a 42-year-old male who developed DVT involving the left lower extremity back in .  At the time patient was seen by Dr. Harding, was diagnosed with positive lupus anticoagulant and has since then been on Eliquis.  He has had chronic pain, swelling involving the left lower extremity.  He has had several Doppler studies done to rule out an acute DVT.  The first Doppler done in 2015 revealed patient had thrombosis in the popliteal vein extending into the posterior tibial veins on the left side.  On 16 due to symptoms of pain and swelling, he had a repeat Doppler study which showed findings of chronic venous thrombosis of the left lower extremity deep venous system involving the left superficial femoral vein and popliteal vein with recanalization and formation of collaterals in the thigh.  Most recently on 16 patient was seen in the emergency room for chronic pain, swelling involving the left lower extremity.  Doppler done showed chronic deep venous thrombosis of the left superficial femoral vein and popliteal vein.  Patient is here today to have further discussions why his clot has not resolved if the Eliquis is working.  He denies chest pain, shortness of breath.  He has no prior history of thrombosis elsewhere in the past.  He wa accompanied by his mother for the appointment on 16.     Since his last visit I have had more records released from Dr. Harding’s office.  On 12/1/15 apparently patient had hypercoagulable work up which was negative for protein C, factor V Leiden, prothrombin gene mutation, anticardiolipin antibodies and beta-2 glycoprotein antibody, but  positive for lupus anticoagulant.  He had a repeat lupus anticoagulant on 3/16/16 which was negative.    12/13/16 - Negative mixing study for inhibitor effect in PTT.  Lupus inhibitor was not identified.    2/28/28 - Doppler ultrasound of the left lower extremity due to complaint of swelling.  This came back negative for acute DVT, but he does have chronic DVT in the left femoral vein.    Patient’s comprehensive genetic analysis with [employer] technology was negative for any significant mutation in his mother.   7/3/18 - CMP:  BUN 10.  Creatinine 1.  Liver function tests, normal.   6/6/2020: Anticardiolipin antibodies normal, beta-2 glycoprotein antibodies normal, lupus anticoagulation not detected, but dRVVT 64.5.     HPI, ROS and PFSH have been reviewed and confirmed on 9/7/2023.     Past Medical History:   Diagnosis Date    Anxiety     Bipolar disorder (manic depression)     Chronic deep vein thrombosis (DVT)     GERD (gastroesophageal reflux disease)        Past Surgical History:   Procedure Laterality Date    APPENDECTOMY      EAR TUBES           Current Outpatient Medications:     apixaban (ELIQUIS) 2.5 MG tablet tablet, Take 1 tablet by mouth 2 (Two) Times a Day., Disp: 60 tablet, Rfl: 6    Allergy Relief 10 MG tablet, Take 10 mg by mouth Daily., Disp: , Rfl:     atorvastatin (LIPITOR) 10 MG tablet, Take 10 mg by mouth every night at bedtime., Disp: , Rfl:     Austedo 12 MG tablet, , Disp: , Rfl:     busPIRone (BUSPAR) 5 MG tablet, Take 1 tablet by mouth Every 12 (Twelve) Hours., Disp: , Rfl:     busPIRone (BUSPAR) 7.5 MG tablet, Take 1 tablet by mouth Every 12 (Twelve) Hours., Disp: , Rfl:     dexlansoprazole (DEXILANT) 60 MG capsule, Take 1 capsule by mouth Daily., Disp: 30 capsule, Rfl: 2    divalproex (DEPAKOTE ER) 250 MG 24 hr tablet, , Disp: , Rfl:     divalproex (DEPAKOTE) 500 MG 24 hr tablet, Take 1,500 mg by mouth Daily., Disp: , Rfl: 0    escitalopram (LEXAPRO) 10 MG tablet, Take 10 mg by mouth  Daily., Disp: , Rfl: 3    fluticasone (FLONASE) 50 MCG/ACT nasal spray, FLONASE 50 MCG/ACT NASAL SUSPENSION, Disp: , Rfl:     hydrOXYzine pamoate (VISTARIL) 25 MG capsule, , Disp: , Rfl:     levothyroxine (SYNTHROID, LEVOTHROID) 50 MCG tablet, Take 50 mcg by mouth Daily., Disp: , Rfl:     lithium (ESKALITH) 450 MG CR tablet, Take 450 mg by mouth Daily., Disp: , Rfl:     lithium (LITHOBID) 300 MG CR tablet, Take 300 mg by mouth Daily., Disp: , Rfl:     Loratadine 10 MG capsule, Take  by mouth., Disp: , Rfl:     propranolol (INDERAL) 20 MG tablet, Take 20 mg by mouth 2 (Two) Times a Day., Disp: , Rfl:     trihexyphenidyl (ARTANE) 5 MG tablet, , Disp: , Rfl:     Vraylar 1.5 MG capsule capsule, Take 1.5 mg by mouth Daily., Disp: , Rfl:     Allergies   Allergen Reactions    Aripiprazole Rash and Swelling    Cephalexin Rash    Penicillins Rash    Risperidone Unknown (See Comments)       Family History   Problem Relation Age of Onset    Colon cancer Maternal Grandmother 55    Ovarian cancer Maternal Grandmother     Basal cell carcinoma Maternal Grandmother         skin    Kidney cancer Maternal Grandfather 51    Leukemia Other     Ovarian cancer Other     Melanoma Other        Cancer-related family history includes Colon cancer (age of onset: 55) in his maternal grandmother; Kidney cancer (age of onset: 51) in his maternal grandfather; Melanoma in an other family member; Ovarian cancer in his maternal grandmother and another family member.    Social History     Tobacco Use    Smoking status: Former     Types: Cigarettes     Quit date: 10/3/2017     Years since quittin.9    Smokeless tobacco: Never   Substance Use Topics    Alcohol use: No    Drug use: No     I have reviewed and confirmed the accuracy of the patient's history: Chief complaint, HPI, ROS, and Subjective as entered by the MA/LPN/RN. Earline Bowman MD 23      SUBJECTIVE:    Patient presents to the clinic for follow-up appointment. No s/s of  "bleeding and no upcoming procedures.     Patient is here today for routine follow-up and does not have any new issues.    REVIEW OF SYSTEMS:    Review of Systems   Constitutional:  Negative for chills, fatigue and fever.   HENT:  Negative for mouth sores and nosebleeds.    Respiratory:  Negative for chest tightness and shortness of breath.    Cardiovascular:  Negative for chest pain and leg swelling.   Gastrointestinal:  Negative for blood in stool, diarrhea, nausea and vomiting.   Genitourinary:  Negative for hematuria.   Musculoskeletal:  Negative for arthralgias and myalgias.   Skin:  Negative for rash and wound.   Neurological:  Negative for dizziness and headaches.   Hematological:  Does not bruise/bleed easily.   Psychiatric/Behavioral:  Negative for confusion.    OBJECTIVE:    Vitals:    09/07/23 1401   BP: 127/81   Pulse: 79   Resp: 18   Temp: 98 °F (36.7 °C)   TempSrc: Infrared   SpO2: 97%   Weight: 121 kg (266 lb)   Height: 175.3 cm (69\")   PainSc: 0-No pain       ECOG    (0) Fully active, able to carry on all predisease performance without restriction    Physical Exam   Constitutional: He is oriented to person, place, and time.  Non-toxic appearance. He does not appear ill. No distress.   HENT:   Head: Normocephalic and atraumatic.   Eyes: Conjunctivae are normal. Right eye exhibits no discharge. No scleral icterus.   Cardiovascular: Normal rate, regular rhythm and normal heart sounds.   Pulmonary/Chest: Effort normal and breath sounds normal. No respiratory distress. He has no wheezes.   Abdominal: Soft. Normal appearance and bowel sounds are normal. He exhibits no distension. There is no abdominal tenderness.   Musculoskeletal: Normal range of motion.   Lymphadenopathy:     He has no cervical adenopathy.   Neurological: He is alert and oriented to person, place, and time.   Skin: Skin is warm. No bruising noted. He is not diaphoretic. No jaundice.   Psychiatric: His behavior is normal. Mood normal. "   Nursing note and vitals reviewed.      I have reexamined the patient and the results are consistent with the previously documented exam. Earline Bowman MD        RECENT LABS    WBC   Date Value Ref Range Status   09/07/2023 10.04 3.40 - 10.80 10*3/mm3 Final     RBC   Date Value Ref Range Status   09/07/2023 5.20 4.14 - 5.80 10*6/mm3 Final     Hemoglobin   Date Value Ref Range Status   09/07/2023 15.3 13.0 - 17.7 g/dL Final     Hematocrit   Date Value Ref Range Status   09/07/2023 46.1 37.5 - 51.0 % Final     MCV   Date Value Ref Range Status   09/07/2023 88.7 79.0 - 97.0 fL Final     MCH   Date Value Ref Range Status   09/07/2023 29.4 26.6 - 33.0 pg Final     MCHC   Date Value Ref Range Status   09/07/2023 33.2 31.5 - 35.7 g/dL Final     RDW   Date Value Ref Range Status   09/07/2023 12.7 12.3 - 15.4 % Final     RDW-SD   Date Value Ref Range Status   09/07/2023 41.4 37.0 - 54.0 fl Final     MPV   Date Value Ref Range Status   09/07/2023 12.1 (H) 6.0 - 12.0 fL Final     Platelets   Date Value Ref Range Status   09/07/2023 285 140 - 450 10*3/mm3 Final     Neutrophil %   Date Value Ref Range Status   09/07/2023 75.5 42.7 - 76.0 % Final     Lymphocyte %   Date Value Ref Range Status   09/07/2023 17.5 (L) 19.6 - 45.3 % Final     Monocyte %   Date Value Ref Range Status   09/07/2023 5.4 5.0 - 12.0 % Final     Eosinophil %   Date Value Ref Range Status   09/07/2023 1.4 0.3 - 6.2 % Final     Basophil %   Date Value Ref Range Status   09/07/2023 0.2 0.0 - 1.5 % Final     Neutrophils, Absolute   Date Value Ref Range Status   09/07/2023 7.58 (H) 1.70 - 7.00 10*3/mm3 Final     Lymphocytes, Absolute   Date Value Ref Range Status   09/07/2023 1.76 0.70 - 3.10 10*3/mm3 Final     Monocytes, Absolute   Date Value Ref Range Status   09/07/2023 0.54 0.10 - 0.90 10*3/mm3 Final     Eosinophils, Absolute   Date Value Ref Range Status   09/07/2023 0.14 0.00 - 0.40 10*3/mm3 Final     Basophils, Absolute   Date Value Ref Range  Status   09/07/2023 0.02 0.00 - 0.20 10*3/mm3 Final     nRBC   Date Value Ref Range Status   01/14/2019 0 0 /100[WBCs] Final       Lab Results   Component Value Date    GLUCOSE 98 03/07/2023    BUN 19 03/07/2023    CREATININE 1.02 03/07/2023    EGFRIFNONA 78 02/22/2022    BCR 18.6 03/07/2023    K 4.5 03/07/2023    CO2 29.0 03/07/2023    CALCIUM 9.9 03/07/2023    ALBUMIN 4.5 03/07/2023    LABIL2 1.3 03/28/2019    AST 26 03/07/2023    ALT 47 (H) 03/07/2023     Assessment & Plan     Thrombophilia  - CBC & Differential  - Comprehensive Metabolic Panel    ASSESSMENT:    Chronic deep venous thrombosis involving the left lower extremity .  Ongoing management  History of positive lupus anticoagulant: Ongoing management  Anticoagulation management: Continue Eliquis 2.5 mg p.o. twice daily  Patient has strong family history of cancer on the maternal side of the family.  His mother’s comprehensive gene test was negative.  Patient does not need genetic testing at this time unless there is family history of cancer on the paternal side.     Assessment has been reviewed    PLANS:    CBC reviewed  CMP ordered  Continue Eliquis 2.5 mg twice a days #60 with refills  Educated patient on signs and symptoms of acute clots  Call for any new or unusual bleeding or bruising  Patient to call should he be scheduled for any surgical procedures in the near future  Follow-up in 6 months me or earlier as needed     I have reviewed lab results, imaging, vitals, and medications with the patient today.  Patient verbalized understanding and is in agreement with the above plan of care.      .

## 2023-09-11 ENCOUNTER — TELEPHONE (OUTPATIENT)
Dept: ONCOLOGY | Facility: CLINIC | Age: 43
End: 2023-09-11
Payer: MEDICAID

## 2024-02-23 ENCOUNTER — OFFICE (OUTPATIENT)
Dept: URBAN - METROPOLITAN AREA CLINIC 64 | Facility: CLINIC | Age: 44
End: 2024-02-23
Payer: COMMERCIAL

## 2024-02-23 VITALS
HEART RATE: 76 BPM | SYSTOLIC BLOOD PRESSURE: 117 MMHG | WEIGHT: 263 LBS | DIASTOLIC BLOOD PRESSURE: 82 MMHG | HEIGHT: 69 IN

## 2024-02-23 DIAGNOSIS — E80.6 OTHER DISORDERS OF BILIRUBIN METABOLISM: ICD-10-CM

## 2024-02-23 DIAGNOSIS — K76.0 FATTY (CHANGE OF) LIVER, NOT ELSEWHERE CLASSIFIED: ICD-10-CM

## 2024-02-23 DIAGNOSIS — E80.4 GILBERT SYNDROME: ICD-10-CM

## 2024-02-23 PROCEDURE — 99204 OFFICE O/P NEW MOD 45 MIN: CPT

## 2024-03-06 NOTE — PROGRESS NOTES
Hematology/Oncology Outpatient Follow Up    PATIENT NAME:Jesus Martínez  :1980  MRN: [id number]  PRIMARY CARE PHYSICIAN: Casimiro Adams MD  REFERRING PHYSICIAN: No ref. provider found    Chief Complaint   Patient presents with    Follow-up     Follow up  Thrombophilia        HISTORY OF PRESENT ILLNESS:     This is a 42-year-old male who developed DVT involving the left lower extremity back in .  At the time patient was seen by Dr. Harding, was diagnosed with positive lupus anticoagulant and has since then been on Eliquis.  He has had chronic pain, swelling involving the left lower extremity.  He has had several Doppler studies done to rule out an acute DVT.  The first Doppler done in 2015 revealed patient had thrombosis in the popliteal vein extending into the posterior tibial veins on the left side.  On 16 due to symptoms of pain and swelling, he had a repeat Doppler study which showed findings of chronic venous thrombosis of the left lower extremity deep venous system involving the left superficial femoral vein and popliteal vein with recanalization and formation of collaterals in the thigh.  Most recently on 16 patient was seen in the emergency room for chronic pain, swelling involving the left lower extremity.  Doppler done showed chronic deep venous thrombosis of the left superficial femoral vein and popliteal vein.  Patient is here today to have further discussions why his clot has not resolved if the Eliquis is working.  He denies chest pain, shortness of breath.  He has no prior history of thrombosis elsewhere in the past.  He wa accompanied by his mother for the appointment on 16.     Since his last visit I have had more records released from Dr. Harding’s office.  On 12/1/15 apparently patient had hypercoagulable work up which was negative for protein C, factor V Leiden, prothrombin gene mutation, anticardiolipin antibodies and beta-2 glycoprotein  antibody, but positive for lupus anticoagulant.  He had a repeat lupus anticoagulant on 3/16/16 which was negative.    12/13/16 - Negative mixing study for inhibitor effect in PTT.  Lupus inhibitor was not identified.    2/28/28 - Doppler ultrasound of the left lower extremity due to complaint of swelling.  This came back negative for acute DVT, but he does have chronic DVT in the left femoral vein.    Patient’s comprehensive genetic analysis with [employer] was negative for any significant mutation in his mother.   7/3/18 - CMP:  BUN 10.  Creatinine 1.  Liver function tests, normal.   6/6/2020: Anticardiolipin antibodies normal, beta-2 glycoprotein antibodies normal, lupus anticoagulation not detected, but dRVVT 64.5.     HPI, ROS and PFSH have been reviewed and confirmed on 3/7/2024.     Past Medical History:   Diagnosis Date    Anxiety     Bipolar disorder (manic depression)     Chronic deep vein thrombosis (DVT)     GERD (gastroesophageal reflux disease)        Past Surgical History:   Procedure Laterality Date    APPENDECTOMY      EAR TUBES           Current Outpatient Medications:     Allergy Relief 10 MG tablet, Take 1 tablet by mouth Daily., Disp: , Rfl:     apixaban (ELIQUIS) 2.5 MG tablet tablet, Take 1 tablet by mouth 2 (Two) Times a Day., Disp: 60 tablet, Rfl: 6    atorvastatin (LIPITOR) 10 MG tablet, Take 1 tablet by mouth every night at bedtime., Disp: , Rfl:     Austedo 12 MG tablet, , Disp: , Rfl:     busPIRone (BUSPAR) 7.5 MG tablet, Take 1 tablet by mouth Every 12 (Twelve) Hours., Disp: , Rfl:     dexlansoprazole (DEXILANT) 60 MG capsule, Take 1 capsule by mouth Daily., Disp: 30 capsule, Rfl: 2    hydrOXYzine pamoate (VISTARIL) 25 MG capsule, , Disp: , Rfl:     levothyroxine (SYNTHROID, LEVOTHROID) 50 MCG tablet, Take 1 tablet by mouth Daily., Disp: , Rfl:     lithium (LITHOBID) 300 MG CR tablet, Take 1 tablet by mouth Daily., Disp: , Rfl:     Vraylar 1.5 MG capsule capsule, Take 1  capsule by mouth Daily., Disp: , Rfl:     lithium (ESKALITH) 450 MG CR tablet, Take 450 mg by mouth Daily. (Patient not taking: Reported on 3/7/2024), Disp: , Rfl:     Loratadine 10 MG capsule, Take  by mouth. (Patient not taking: Reported on 3/7/2024), Disp: , Rfl:     Allergies   Allergen Reactions    Aripiprazole Rash and Swelling    Cephalexin Rash    Penicillins Rash    Risperidone Unknown (See Comments)       Family History   Problem Relation Age of Onset    Colon cancer Maternal Grandmother 55    Ovarian cancer Maternal Grandmother     Basal cell carcinoma Maternal Grandmother         skin    Kidney cancer Maternal Grandfather 51    Leukemia Other     Ovarian cancer Other     Melanoma Other        Cancer-related family history includes Colon cancer (age of onset: 55) in his maternal grandmother; Kidney cancer (age of onset: 51) in his maternal grandfather; Melanoma in an other family member; Ovarian cancer in his maternal grandmother and another family member.    Social History     Tobacco Use    Smoking status: Former     Current packs/day: 0.00     Types: Cigarettes     Quit date: 10/3/2017     Years since quittin.4    Smokeless tobacco: Never   Vaping Use    Vaping status: Never Used   Substance Use Topics    Alcohol use: No    Drug use: No         SUBJECTIVE:  Jesus is here today for his routine follow-up.  He reports that he is doing well on his Eliquis and compliant with the 2.5 mg p.o. twice daily dosing.  He denies any signs or symptoms of bleeding.  Denies any signs or symptoms of recurrent VTE.  Has no upcoming procedures planned.    REVIEW OF SYSTEMS:    Review of Systems   Constitutional:  Negative for chills, fatigue and fever.   HENT:  Negative for mouth sores and nosebleeds.    Respiratory:  Negative for chest tightness and shortness of breath.    Cardiovascular:  Negative for chest pain and leg swelling.   Gastrointestinal:  Negative for blood in stool, diarrhea, nausea and vomiting.  "  Genitourinary:  Negative for hematuria.   Musculoskeletal:  Negative for arthralgias and myalgias.   Skin:  Negative for rash and wound.   Neurological:  Negative for dizziness and headaches.   Hematological:  Does not bruise/bleed easily.   Psychiatric/Behavioral:  Negative for confusion.      OBJECTIVE:    Vitals:    03/07/24 1332   BP: 137/82   Pulse: 76   SpO2: 100%   Weight: 121 kg (267 lb)   Height: 175.3 cm (69\")   PainSc: 0-No pain         ECOG    (0) Fully active, able to carry on all predisease performance without restriction    Physical Exam   Constitutional: He is oriented to person, place, and time.  Non-toxic appearance. He does not appear ill. No distress.   HENT:   Head: Normocephalic and atraumatic.   Eyes: Conjunctivae are normal. Right eye exhibits no discharge. No scleral icterus.   Cardiovascular: Normal rate, regular rhythm and normal heart sounds.   Pulmonary/Chest: Effort normal and breath sounds normal. No respiratory distress. He has no wheezes.   Abdominal: Soft. Normal appearance and bowel sounds are normal. He exhibits no distension. There is no abdominal tenderness.   Musculoskeletal: Normal range of motion.   Lymphadenopathy:     He has no cervical adenopathy.   Neurological: He is alert and oriented to person, place, and time.   Skin: Skin is warm. No bruising noted. He is not diaphoretic. No jaundice.   Psychiatric: His behavior is normal. Mood normal.   Nursing note and vitals reviewed.            RECENT LABS    WBC   Date Value Ref Range Status   03/07/2024 10.39 3.40 - 10.80 10*3/mm3 Final     RBC   Date Value Ref Range Status   03/07/2024 5.15 4.14 - 5.80 10*6/mm3 Final     Hemoglobin   Date Value Ref Range Status   03/07/2024 15.3 13.0 - 17.7 g/dL Final     Hematocrit   Date Value Ref Range Status   03/07/2024 46.5 37.5 - 51.0 % Final     MCV   Date Value Ref Range Status   03/07/2024 90.3 79.0 - 97.0 fL Final     MCH   Date Value Ref Range Status   03/07/2024 29.7 26.6 - 33.0 " pg Final     MCHC   Date Value Ref Range Status   03/07/2024 32.9 31.5 - 35.7 g/dL Final     RDW   Date Value Ref Range Status   03/07/2024 13.0 12.3 - 15.4 % Final     RDW-SD   Date Value Ref Range Status   03/07/2024 42.1 37.0 - 54.0 fl Final     MPV   Date Value Ref Range Status   03/07/2024 12.5 (H) 6.0 - 12.0 fL Final     Platelets   Date Value Ref Range Status   03/07/2024 227 140 - 450 10*3/mm3 Final     Neutrophil %   Date Value Ref Range Status   03/07/2024 77.7 (H) 42.7 - 76.0 % Final     Lymphocyte %   Date Value Ref Range Status   03/07/2024 16.1 (L) 19.6 - 45.3 % Final     Monocyte %   Date Value Ref Range Status   03/07/2024 4.0 (L) 5.0 - 12.0 % Final     Eosinophil %   Date Value Ref Range Status   03/07/2024 1.8 0.3 - 6.2 % Final     Basophil %   Date Value Ref Range Status   03/07/2024 0.4 0.0 - 1.5 % Final     Neutrophils, Absolute   Date Value Ref Range Status   03/07/2024 8.07 (H) 1.70 - 7.00 10*3/mm3 Final     Lymphocytes, Absolute   Date Value Ref Range Status   03/07/2024 1.67 0.70 - 3.10 10*3/mm3 Final     Monocytes, Absolute   Date Value Ref Range Status   03/07/2024 0.42 0.10 - 0.90 10*3/mm3 Final     Eosinophils, Absolute   Date Value Ref Range Status   03/07/2024 0.19 0.00 - 0.40 10*3/mm3 Final     Basophils, Absolute   Date Value Ref Range Status   03/07/2024 0.04 0.00 - 0.20 10*3/mm3 Final     nRBC   Date Value Ref Range Status   01/14/2019 0 0 /100[WBCs] Final       Lab Results   Component Value Date    GLUCOSE 112 (H) 09/07/2023    BUN 13 09/07/2023    CREATININE 1.03 09/07/2023    EGFRIFNONA 78 02/22/2022    BCR 12.6 09/07/2023    K 4.3 09/07/2023    CO2 25.0 09/07/2023    CALCIUM 9.6 09/07/2023    ALBUMIN 4.9 09/07/2023    LABIL2 1.3 03/28/2019    AST 16 09/07/2023    ALT 30 09/07/2023     Assessment & Plan     Thrombophilia  - CBC & Differential  - Comprehensive Metabolic Panel  - apixaban (ELIQUIS) 2.5 MG tablet tablet      ASSESSMENT:    Chronic deep venous thrombosis involving  the left lower extremity .  Ongoing management  History of positive lupus anticoagulant: Ongoing management  Anticoagulation management: Continue Eliquis 2.5 mg p.o. twice daily  Patient has strong family history of cancer on the maternal side of the family.  His mother’s comprehensive gene test was negative.  Patient does not need genetic testing at this time unless there is family history of cancer on the paternal side.     Assessment has been reviewed    PLANS:    CBC reviewed  CMP ordered  Continue Eliquis 2.5 mg twice daily.  Refilled today.  Educated patient on signs and symptoms of acute clots  Call for any new or unusual worsening or bleeding  Patient advised to call for any planned procedures necessitating holding of Eliquis.  Follow-up in 6 months with Dr. Bowman or sooner if needed     I have reviewed lab results, imaging, vitals, and medications with the patient today.  Patient verbalized understanding and is in agreement with the above plan of care.

## 2024-03-07 ENCOUNTER — OFFICE VISIT (OUTPATIENT)
Dept: ONCOLOGY | Facility: CLINIC | Age: 44
End: 2024-03-07
Payer: MEDICAID

## 2024-03-07 ENCOUNTER — LAB (OUTPATIENT)
Dept: LAB | Facility: HOSPITAL | Age: 44
End: 2024-03-07
Payer: MEDICAID

## 2024-03-07 VITALS
HEIGHT: 69 IN | DIASTOLIC BLOOD PRESSURE: 82 MMHG | HEART RATE: 76 BPM | OXYGEN SATURATION: 100 % | BODY MASS INDEX: 39.55 KG/M2 | SYSTOLIC BLOOD PRESSURE: 137 MMHG | WEIGHT: 267 LBS

## 2024-03-07 DIAGNOSIS — D68.59 THROMBOPHILIA: Primary | ICD-10-CM

## 2024-03-07 LAB
ALBUMIN SERPL-MCNC: 4.8 G/DL (ref 3.5–5.2)
ALBUMIN/GLOB SERPL: 2.2 G/DL
ALP SERPL-CCNC: 90 U/L (ref 39–117)
ALT SERPL W P-5'-P-CCNC: 24 U/L (ref 1–41)
ANION GAP SERPL CALCULATED.3IONS-SCNC: 12 MMOL/L (ref 5–15)
AST SERPL-CCNC: 14 U/L (ref 1–40)
BASOPHILS # BLD AUTO: 0.04 10*3/MM3 (ref 0–0.2)
BASOPHILS NFR BLD AUTO: 0.4 % (ref 0–1.5)
BILIRUB SERPL-MCNC: 1.9 MG/DL (ref 0–1.2)
BUN SERPL-MCNC: 10 MG/DL (ref 6–20)
BUN/CREAT SERPL: 8.2 (ref 7–25)
CALCIUM SPEC-SCNC: 9.5 MG/DL (ref 8.6–10.5)
CHLORIDE SERPL-SCNC: 105 MMOL/L (ref 98–107)
CO2 SERPL-SCNC: 24 MMOL/L (ref 22–29)
CREAT SERPL-MCNC: 1.22 MG/DL (ref 0.76–1.27)
DEPRECATED RDW RBC AUTO: 42.1 FL (ref 37–54)
EGFRCR SERPLBLD CKD-EPI 2021: 76.4 ML/MIN/1.73
EOSINOPHIL # BLD AUTO: 0.19 10*3/MM3 (ref 0–0.4)
EOSINOPHIL NFR BLD AUTO: 1.8 % (ref 0.3–6.2)
ERYTHROCYTE [DISTWIDTH] IN BLOOD BY AUTOMATED COUNT: 13 % (ref 12.3–15.4)
GLOBULIN UR ELPH-MCNC: 2.2 GM/DL
GLUCOSE SERPL-MCNC: 113 MG/DL (ref 65–99)
HCT VFR BLD AUTO: 46.5 % (ref 37.5–51)
HGB BLD-MCNC: 15.3 G/DL (ref 13–17.7)
HOLD SPECIMEN: NORMAL
HOLD SPECIMEN: NORMAL
LYMPHOCYTES # BLD AUTO: 1.67 10*3/MM3 (ref 0.7–3.1)
LYMPHOCYTES NFR BLD AUTO: 16.1 % (ref 19.6–45.3)
MCH RBC QN AUTO: 29.7 PG (ref 26.6–33)
MCHC RBC AUTO-ENTMCNC: 32.9 G/DL (ref 31.5–35.7)
MCV RBC AUTO: 90.3 FL (ref 79–97)
MONOCYTES # BLD AUTO: 0.42 10*3/MM3 (ref 0.1–0.9)
MONOCYTES NFR BLD AUTO: 4 % (ref 5–12)
NEUTROPHILS NFR BLD AUTO: 77.7 % (ref 42.7–76)
NEUTROPHILS NFR BLD AUTO: 8.07 10*3/MM3 (ref 1.7–7)
PLATELET # BLD AUTO: 227 10*3/MM3 (ref 140–450)
PMV BLD AUTO: 12.5 FL (ref 6–12)
POTASSIUM SERPL-SCNC: 4.6 MMOL/L (ref 3.5–5.2)
PROT SERPL-MCNC: 7 G/DL (ref 6–8.5)
RBC # BLD AUTO: 5.15 10*6/MM3 (ref 4.14–5.8)
SODIUM SERPL-SCNC: 141 MMOL/L (ref 136–145)
WBC NRBC COR # BLD AUTO: 10.39 10*3/MM3 (ref 3.4–10.8)

## 2024-03-07 PROCEDURE — 80053 COMPREHEN METABOLIC PANEL: CPT | Performed by: NURSE PRACTITIONER

## 2024-03-07 PROCEDURE — 36415 COLL VENOUS BLD VENIPUNCTURE: CPT

## 2024-03-07 PROCEDURE — 85025 COMPLETE CBC W/AUTO DIFF WBC: CPT

## 2024-09-09 ENCOUNTER — OFFICE VISIT (OUTPATIENT)
Dept: ONCOLOGY | Facility: CLINIC | Age: 44
End: 2024-09-09
Payer: MEDICAID

## 2024-09-09 ENCOUNTER — LAB (OUTPATIENT)
Dept: LAB | Facility: HOSPITAL | Age: 44
End: 2024-09-09
Payer: MEDICAID

## 2024-09-09 VITALS
HEIGHT: 69 IN | RESPIRATION RATE: 18 BRPM | BODY MASS INDEX: 39.4 KG/M2 | DIASTOLIC BLOOD PRESSURE: 82 MMHG | TEMPERATURE: 98.2 F | HEART RATE: 72 BPM | SYSTOLIC BLOOD PRESSURE: 122 MMHG | OXYGEN SATURATION: 96 % | WEIGHT: 266 LBS

## 2024-09-09 DIAGNOSIS — D68.59 THROMBOPHILIA: Primary | ICD-10-CM

## 2024-09-09 LAB
ALBUMIN SERPL-MCNC: 4.8 G/DL (ref 3.5–5.2)
ALBUMIN/GLOB SERPL: 1.8 G/DL
ALP SERPL-CCNC: 98 U/L (ref 39–117)
ALT SERPL W P-5'-P-CCNC: 63 U/L (ref 1–41)
ANION GAP SERPL CALCULATED.3IONS-SCNC: 8.7 MMOL/L (ref 5–15)
AST SERPL-CCNC: 30 U/L (ref 1–40)
BASOPHILS # BLD AUTO: 0.07 10*3/MM3 (ref 0–0.2)
BASOPHILS NFR BLD AUTO: 0.6 % (ref 0–1.5)
BILIRUB SERPL-MCNC: 1.5 MG/DL (ref 0–1.2)
BUN SERPL-MCNC: 17 MG/DL (ref 6–20)
BUN/CREAT SERPL: 15.2 (ref 7–25)
CALCIUM SPEC-SCNC: 10.2 MG/DL (ref 8.6–10.5)
CHLORIDE SERPL-SCNC: 104 MMOL/L (ref 98–107)
CO2 SERPL-SCNC: 27.3 MMOL/L (ref 22–29)
CREAT SERPL-MCNC: 1.12 MG/DL (ref 0.76–1.27)
DEPRECATED RDW RBC AUTO: 42.9 FL (ref 37–54)
EGFRCR SERPLBLD CKD-EPI 2021: 84.6 ML/MIN/1.73
EOSINOPHIL # BLD AUTO: 0.15 10*3/MM3 (ref 0–0.4)
EOSINOPHIL NFR BLD AUTO: 1.3 % (ref 0.3–6.2)
ERYTHROCYTE [DISTWIDTH] IN BLOOD BY AUTOMATED COUNT: 13.2 % (ref 12.3–15.4)
GLOBULIN UR ELPH-MCNC: 2.6 GM/DL
GLUCOSE SERPL-MCNC: 92 MG/DL (ref 65–99)
HCT VFR BLD AUTO: 48.6 % (ref 37.5–51)
HGB BLD-MCNC: 16.2 G/DL (ref 13–17.7)
HOLD SPECIMEN: NORMAL
HOLD SPECIMEN: NORMAL
LYMPHOCYTES # BLD AUTO: 1.73 10*3/MM3 (ref 0.7–3.1)
LYMPHOCYTES NFR BLD AUTO: 14.8 % (ref 19.6–45.3)
MCH RBC QN AUTO: 30.1 PG (ref 26.6–33)
MCHC RBC AUTO-ENTMCNC: 33.3 G/DL (ref 31.5–35.7)
MCV RBC AUTO: 90.3 FL (ref 79–97)
MONOCYTES # BLD AUTO: 0.63 10*3/MM3 (ref 0.1–0.9)
MONOCYTES NFR BLD AUTO: 5.4 % (ref 5–12)
NEUTROPHILS NFR BLD AUTO: 77.9 % (ref 42.7–76)
NEUTROPHILS NFR BLD AUTO: 9.14 10*3/MM3 (ref 1.7–7)
PLATELET # BLD AUTO: 307 10*3/MM3 (ref 140–450)
PMV BLD AUTO: 11.8 FL (ref 6–12)
POTASSIUM SERPL-SCNC: 4.4 MMOL/L (ref 3.5–5.2)
PROT SERPL-MCNC: 7.4 G/DL (ref 6–8.5)
RBC # BLD AUTO: 5.38 10*6/MM3 (ref 4.14–5.8)
SODIUM SERPL-SCNC: 140 MMOL/L (ref 136–145)
WBC NRBC COR # BLD AUTO: 11.72 10*3/MM3 (ref 3.4–10.8)

## 2024-09-09 PROCEDURE — 1126F AMNT PAIN NOTED NONE PRSNT: CPT | Performed by: INTERNAL MEDICINE

## 2024-09-09 PROCEDURE — 99213 OFFICE O/P EST LOW 20 MIN: CPT | Performed by: INTERNAL MEDICINE

## 2024-09-09 PROCEDURE — 85025 COMPLETE CBC W/AUTO DIFF WBC: CPT

## 2024-09-09 PROCEDURE — 80053 COMPREHEN METABOLIC PANEL: CPT | Performed by: INTERNAL MEDICINE

## 2024-09-09 PROCEDURE — 36415 COLL VENOUS BLD VENIPUNCTURE: CPT

## 2024-09-09 RX ORDER — DIVALPROEX SODIUM 500 MG/1
3 TABLET, EXTENDED RELEASE ORAL DAILY
COMMUNITY

## 2024-09-09 NOTE — PROGRESS NOTES
Hematology/Oncology Outpatient Follow Up    PATIENT NAME:Jesus Martínez  :1980  MRN: [id number]  PRIMARY CARE PHYSICIAN: Casimiro Adams MD  REFERRING PHYSICIAN: No ref. provider found    Chief Complaint   Patient presents with    Follow-up     Thrombophilia            HISTORY OF PRESENT ILLNESS:     This is a 42-year-old male who developed DVT involving the left lower extremity back in .  At the time patient was seen by Dr. Harding, was diagnosed with positive lupus anticoagulant and has since then been on Eliquis.  He has had chronic pain, swelling involving the left lower extremity.  He has had several Doppler studies done to rule out an acute DVT.  The first Doppler done in 2015 revealed patient had thrombosis in the popliteal vein extending into the posterior tibial veins on the left side.  On 16 due to symptoms of pain and swelling, he had a repeat Doppler study which showed findings of chronic venous thrombosis of the left lower extremity deep venous system involving the left superficial femoral vein and popliteal vein with recanalization and formation of collaterals in the thigh.  Most recently on 16 patient was seen in the emergency room for chronic pain, swelling involving the left lower extremity.  Doppler done showed chronic deep venous thrombosis of the left superficial femoral vein and popliteal vein.  Patient is here today to have further discussions why his clot has not resolved if the Eliquis is working.  He denies chest pain, shortness of breath.  He has no prior history of thrombosis elsewhere in the past.  He wa accompanied by his mother for the appointment on 16.     Since his last visit I have had more records released from Dr. Harding’s office.  On 12/1/15 apparently patient had hypercoagulable work up which was negative for protein C, factor V Leiden, prothrombin gene mutation, anticardiolipin antibodies and beta-2 glycoprotein antibody, but  positive for lupus anticoagulant.  He had a repeat lupus anticoagulant on 3/16/16 which was negative.    12/13/16 - Negative mixing study for inhibitor effect in PTT.  Lupus inhibitor was not identified.    2/28/28 - Doppler ultrasound of the left lower extremity due to complaint of swelling.  This came back negative for acute DVT, but he does have chronic DVT in the left femoral vein.    Patient’s comprehensive genetic analysis with [email or web address] technology was negative for any significant mutation in his mother.   7/3/18 - CMP:  BUN 10.  Creatinine 1.  Liver function tests, normal.   6/6/2020: Anticardiolipin antibodies normal, beta-2 glycoprotein antibodies normal, lupus anticoagulation not detected, but dRVVT 64.5.     HPI, ROS and PFSH have been reviewed and confirmed on 9/9/2024.     Past Medical History:   Diagnosis Date    Anxiety     Bipolar disorder (manic depression)     Chronic deep vein thrombosis (DVT)     GERD (gastroesophageal reflux disease)        Past Surgical History:   Procedure Laterality Date    APPENDECTOMY      EAR TUBES           Current Outpatient Medications:     Allergy Relief 10 MG tablet, Take 1 tablet by mouth Daily., Disp: , Rfl:     apixaban (ELIQUIS) 2.5 MG tablet tablet, Take 1 tablet by mouth 2 (Two) Times a Day., Disp: 60 tablet, Rfl: 6    atorvastatin (LIPITOR) 10 MG tablet, Take 1 tablet by mouth every night at bedtime., Disp: , Rfl:     Austedo 12 MG tablet, , Disp: , Rfl:     busPIRone (BUSPAR) 7.5 MG tablet, Take 1 tablet by mouth Every 12 (Twelve) Hours., Disp: , Rfl:     dexlansoprazole (DEXILANT) 60 MG capsule, Take 1 capsule by mouth Daily., Disp: 30 capsule, Rfl: 2    divalproex (DEPAKOTE ER) 500 MG 24 hr tablet, Take 3 tablets by mouth Daily., Disp: , Rfl:     hydrOXYzine pamoate (VISTARIL) 25 MG capsule, , Disp: , Rfl:     levothyroxine (SYNTHROID, LEVOTHROID) 50 MCG tablet, Take 1 tablet by mouth Daily., Disp: , Rfl:     lithium (ESKALITH) 450 MG CR tablet, Take 450 mg  by mouth Daily. (Patient not taking: Reported on 3/7/2024), Disp: , Rfl:     lithium (LITHOBID) 300 MG CR tablet, Take 1 tablet by mouth Daily., Disp: , Rfl:     Loratadine 10 MG capsule, Take  by mouth. (Patient not taking: Reported on 3/7/2024), Disp: , Rfl:     Vraylar 1.5 MG capsule capsule, Take 1 capsule by mouth Daily., Disp: , Rfl:     Allergies   Allergen Reactions    Aripiprazole Rash and Swelling    Cephalexin Rash    Penicillins Rash    Risperidone Unknown (See Comments)       Family History   Problem Relation Age of Onset    Colon cancer Maternal Grandmother 55    Ovarian cancer Maternal Grandmother     Basal cell carcinoma Maternal Grandmother         skin    Kidney cancer Maternal Grandfather 51    Leukemia Other     Ovarian cancer Other     Melanoma Other        Cancer-related family history includes Colon cancer (age of onset: 55) in his maternal grandmother; Kidney cancer (age of onset: 51) in his maternal grandfather; Melanoma in an other family member; Ovarian cancer in his maternal grandmother and another family member.    Social History     Tobacco Use    Smoking status: Former     Current packs/day: 0.00     Types: Cigarettes     Quit date: 10/3/2017     Years since quittin.9    Smokeless tobacco: Never   Vaping Use    Vaping status: Never Used   Substance Use Topics    Alcohol use: No    Drug use: No       I have reviewed and confirmed the accuracy of the patient's history: Chief complaint, HPI, ROS, and Subjective as entered by the MA/LPN/RN. Earline Bowman MD 24          SUBJECTIVE:    Jesus is here today for his routine follow-up.  He reports that he is doing well on his Eliquis and compliant with the 2.5 mg p.o. twice daily dosing.  He denies any signs or symptoms of bleeding.  Denies any signs or symptoms of recurrent VTE.  Has no upcoming procedures planned.    Patient is here today for follow-up and denies any new issues    REVIEW OF SYSTEMS:    Review of Systems  "  Constitutional:  Negative for chills, fatigue and fever.   HENT:  Negative for mouth sores and nosebleeds.    Respiratory:  Negative for chest tightness and shortness of breath.    Cardiovascular:  Negative for chest pain and leg swelling.   Gastrointestinal:  Negative for blood in stool, diarrhea, nausea and vomiting.   Genitourinary:  Negative for hematuria.   Musculoskeletal:  Negative for arthralgias and myalgias.   Skin:  Negative for rash and wound.   Neurological:  Negative for dizziness and headaches.   Hematological:  Does not bruise/bleed easily.   Psychiatric/Behavioral:  Negative for confusion.      OBJECTIVE:    Vitals:    09/09/24 1335   BP: 122/82   Pulse: 72   Resp: 18   Temp: 98.2 °F (36.8 °C)   TempSrc: Infrared   SpO2: 96%   Weight: 121 kg (266 lb)   Height: 175.3 cm (69\")   PainSc: 0-No pain           ECOG    (0) Fully active, able to carry on all predisease performance without restriction    Physical Exam   Constitutional: He is oriented to person, place, and time.  Non-toxic appearance. He does not appear ill. No distress.   HENT:   Head: Normocephalic and atraumatic.   Eyes: Conjunctivae are normal. Right eye exhibits no discharge. No scleral icterus.   Cardiovascular: Normal rate, regular rhythm and normal heart sounds.   Pulmonary/Chest: Effort normal and breath sounds normal. No respiratory distress. He has no wheezes.   Abdominal: Soft. Normal appearance and bowel sounds are normal. He exhibits no distension. There is no abdominal tenderness.   Musculoskeletal: Normal range of motion.   Lymphadenopathy:     He has no cervical adenopathy.   Neurological: He is alert and oriented to person, place, and time.   Skin: Skin is warm. No bruising noted. He is not diaphoretic. No jaundice.   Psychiatric: His behavior is normal. Mood normal.   Nursing note and vitals reviewed.      I have reexamined the patient and the results are consistent with the previously documented exam. Earline Mcelroy " MD [place]        RECENT LABS    WBC   Date Value Ref Range Status   09/09/2024 11.72 (H) 3.40 - 10.80 10*3/mm3 Final     RBC   Date Value Ref Range Status   09/09/2024 5.38 4.14 - 5.80 10*6/mm3 Final     Hemoglobin   Date Value Ref Range Status   09/09/2024 16.2 13.0 - 17.7 g/dL Final     Hematocrit   Date Value Ref Range Status   09/09/2024 48.6 37.5 - 51.0 % Final     MCV   Date Value Ref Range Status   09/09/2024 90.3 79.0 - 97.0 fL Final     MCH   Date Value Ref Range Status   09/09/2024 30.1 26.6 - 33.0 pg Final     MCHC   Date Value Ref Range Status   09/09/2024 33.3 31.5 - 35.7 g/dL Final     RDW   Date Value Ref Range Status   09/09/2024 13.2 12.3 - 15.4 % Final     RDW-SD   Date Value Ref Range Status   09/09/2024 42.9 37.0 - 54.0 fl Final     MPV   Date Value Ref Range Status   09/09/2024 11.8 6.0 - 12.0 fL Final     Platelets   Date Value Ref Range Status   09/09/2024 307 140 - 450 10*3/mm3 Final     Neutrophil %   Date Value Ref Range Status   09/09/2024 77.9 (H) 42.7 - 76.0 % Final     Lymphocyte %   Date Value Ref Range Status   09/09/2024 14.8 (L) 19.6 - 45.3 % Final     Monocyte %   Date Value Ref Range Status   09/09/2024 5.4 5.0 - 12.0 % Final     Eosinophil %   Date Value Ref Range Status   09/09/2024 1.3 0.3 - 6.2 % Final     Basophil %   Date Value Ref Range Status   09/09/2024 0.6 0.0 - 1.5 % Final     Neutrophils, Absolute   Date Value Ref Range Status   09/09/2024 9.14 (H) 1.70 - 7.00 10*3/mm3 Final     Lymphocytes, Absolute   Date Value Ref Range Status   09/09/2024 1.73 0.70 - 3.10 10*3/mm3 Final     Monocytes, Absolute   Date Value Ref Range Status   09/09/2024 0.63 0.10 - 0.90 10*3/mm3 Final     Eosinophils, Absolute   Date Value Ref Range Status   09/09/2024 0.15 0.00 - 0.40 10*3/mm3 Final     Basophils, Absolute   Date Value Ref Range Status   09/09/2024 0.07 0.00 - 0.20 10*3/mm3 Final     nRBC   Date Value Ref Range Status   01/14/2019 0 0 /100[WBCs] Final       Lab Results   Component  Value Date    GLUCOSE 113 (H) 03/07/2024    BUN 10 03/07/2024    CREATININE 1.22 03/07/2024    EGFRIFNONA 78 02/22/2022    BCR 8.2 03/07/2024    K 4.6 03/07/2024    CO2 24.0 03/07/2024    CALCIUM 9.5 03/07/2024    ALBUMIN 4.8 03/07/2024    LABIL2 1.3 03/28/2019    AST 14 03/07/2024    ALT 24 03/07/2024     Assessment & Plan     Thrombophilia  - CBC & Differential        ASSESSMENT:    Chronic deep venous thrombosis involving the left lower extremity .  Ongoing management  History of positive lupus anticoagulant: Ongoing management  Anticoagulation management: Continue Eliquis 2.5 mg p.o. twice daily  Patient has strong family history of cancer on the maternal side of the family.  His mother’s comprehensive gene test was negative.  Patient does not need genetic testing at this time unless there is family history of cancer on the paternal side.     Assessment has been reviewed    PLANS:    CBC reviewed  CMP ordered continue  Continue Eliquis 2.5 mg twice daily.  Refilled today.  Educated patient on signs and symptoms of acute clots  Call for any new or unusual worsening or bleeding  Patient advised to call for any planned procedures necessitating holding of Eliquis.  Follow-up in 6 months     I have reviewed lab results, imaging, vitals, and medications with the patient today.  Patient verbalized understanding and is in agreement with the above plan of care.

## 2025-03-14 NOTE — PROGRESS NOTES
Hematology/Oncology Outpatient Follow Up    PATIENT NAME:Jesus Martínez  :1980  MRN: [id number]  PRIMARY CARE PHYSICIAN: Casimiro Adams MD  REFERRING PHYSICIAN: Casimiro Adams,*    Chief Complaint   Patient presents with    Follow-up     Thrombophilia        HISTORY OF PRESENT ILLNESS:     This is a 42-year-old male who developed DVT involving the left lower extremity back in .  At the time patient was seen by Dr. Harding, was diagnosed with positive lupus anticoagulant and has since then been on Eliquis.  He has had chronic pain, swelling involving the left lower extremity.  He has had several Doppler studies done to rule out an acute DVT.  The first Doppler done in 2015 revealed patient had thrombosis in the popliteal vein extending into the posterior tibial veins on the left side.  On 16 due to symptoms of pain and swelling, he had a repeat Doppler study which showed findings of chronic venous thrombosis of the left lower extremity deep venous system involving the left superficial femoral vein and popliteal vein with recanalization and formation of collaterals in the thigh.  Most recently on 16 patient was seen in the emergency room for chronic pain, swelling involving the left lower extremity.  Doppler done showed chronic deep venous thrombosis of the left superficial femoral vein and popliteal vein.  Patient is here today to have further discussions why his clot has not resolved if the Eliquis is working.  He denies chest pain, shortness of breath.  He has no prior history of thrombosis elsewhere in the past.  He wa accompanied by his mother for the appointment on 16.     Since his last visit I have had more records released from Dr. Harding’s office.  On 12/1/15 apparently patient had hypercoagulable work up which was negative for protein C, factor V Leiden, prothrombin gene mutation, anticardiolipin antibodies and beta-2 glycoprotein antibody, but  positive for lupus anticoagulant.  He had a repeat lupus anticoagulant on 3/16/16 which was negative.    12/13/16 - Negative mixing study for inhibitor effect in PTT.  Lupus inhibitor was not identified.    2/28/28 - Doppler ultrasound of the left lower extremity due to complaint of swelling.  This came back negative for acute DVT, but he does have chronic DVT in the left femoral vein.    Patient’s comprehensive genetic analysis with [employer] technology was negative for any significant mutation in his mother.   7/3/18 - CMP:  BUN 10.  Creatinine 1.  Liver function tests, normal.   6/6/2020: Anticardiolipin antibodies normal, beta-2 glycoprotein antibodies normal, lupus anticoagulation not detected, but dRVVT 64.5.     HPI, ROS and PFSH have been reviewed and confirmed on 3/17/2025.     Past Medical History:   Diagnosis Date    Anxiety     Bipolar disorder (manic depression)     Chronic deep vein thrombosis (DVT)     GERD (gastroesophageal reflux disease)        Past Surgical History:   Procedure Laterality Date    APPENDECTOMY      EAR TUBES           Current Outpatient Medications:     apixaban (ELIQUIS) 2.5 MG tablet tablet, Take 1 tablet by mouth 2 (Two) Times a Day., Disp: 60 tablet, Rfl: 6    atorvastatin (LIPITOR) 10 MG tablet, Take 1 tablet by mouth every night at bedtime., Disp: , Rfl:     Austedo 12 MG tablet, , Disp: , Rfl:     busPIRone (BUSPAR) 7.5 MG tablet, Take 1 tablet by mouth Every 12 (Twelve) Hours., Disp: , Rfl:     dexlansoprazole (DEXILANT) 60 MG capsule, Take 1 capsule by mouth Daily., Disp: 30 capsule, Rfl: 2    hydrOXYzine pamoate (VISTARIL) 25 MG capsule, , Disp: , Rfl:     levothyroxine (SYNTHROID, LEVOTHROID) 50 MCG tablet, Take 1 tablet by mouth Daily., Disp: , Rfl:     lithium (ESKALITH) 450 MG CR tablet, Take 1 tablet by mouth Daily., Disp: , Rfl:     Loratadine 10 MG capsule, Take  by mouth., Disp: , Rfl:     Vraylar 1.5 MG capsule capsule, Take 1 capsule by mouth Daily., Disp: ,  Rfl:     Allergy Relief 10 MG tablet, Take 1 tablet by mouth Daily. (Patient not taking: Reported on 3/17/2025), Disp: , Rfl:     divalproex (DEPAKOTE ER) 500 MG 24 hr tablet, Take 3 tablets by mouth Daily. (Patient not taking: Reported on 3/17/2025), Disp: , Rfl:     lithium (LITHOBID) 300 MG CR tablet, Take 1 tablet by mouth Daily. (Patient not taking: Reported on 3/17/2025), Disp: , Rfl:     Allergies   Allergen Reactions    Aripiprazole Rash and Swelling    Cephalexin Rash    Penicillins Rash    Risperidone Unknown (See Comments)       Family History   Problem Relation Age of Onset    Colon cancer Maternal Grandmother 55    Ovarian cancer Maternal Grandmother     Basal cell carcinoma Maternal Grandmother         skin    Kidney cancer Maternal Grandfather 51    Leukemia Other     Ovarian cancer Other     Melanoma Other        Cancer-related family history includes Colon cancer (age of onset: 55) in his maternal grandmother; Kidney cancer (age of onset: 51) in his maternal grandfather; Melanoma in an other family member; Ovarian cancer in his maternal grandmother and another family member.    Social History     Tobacco Use    Smoking status: Former     Current packs/day: 0.00     Types: Cigarettes     Quit date: 10/3/2017     Years since quittin.4    Smokeless tobacco: Never   Vaping Use    Vaping status: Never Used   Substance Use Topics    Alcohol use: No    Drug use: No       I have reviewed and confirmed the accuracy of the patient's history: Chief complaint, HPI, ROS, and Subjective as entered by the MA/LPN/RN.  25          SUBJECTIVE:  3/17/2025: Jesus is here today for his routine follow-up.  He is currently on Eliquis 2.5 mg p.o. twice a day and tolerates without issue.  Denies any signs or symptoms of bleeding.  Denies any signs or symptoms of recurrent VTE.  No planned procedures upcoming.      REVIEW OF SYSTEMS:    Review of Systems   Constitutional:  Negative for chills, fatigue and fever.  "  HENT:  Negative for mouth sores and nosebleeds.    Respiratory:  Negative for chest tightness and shortness of breath.    Cardiovascular:  Negative for chest pain and leg swelling.   Gastrointestinal:  Negative for blood in stool, diarrhea, nausea and vomiting.   Genitourinary:  Negative for hematuria.   Musculoskeletal:  Negative for arthralgias and myalgias.   Skin:  Negative for rash and wound.   Neurological:  Negative for dizziness and headaches.   Hematological:  Does not bruise/bleed easily.   Psychiatric/Behavioral:  Negative for confusion.      OBJECTIVE:    Vitals:    03/17/25 1357   BP: 119/80   Pulse: 70   Temp: 98.2 °F (36.8 °C)   SpO2: 97%   Weight: 119 kg (262 lb)   Height: 175.3 cm (69.02\")   PainSc: 0-No pain             ECOG    (0) Fully active, able to carry on all predisease performance without restriction    Physical Exam   Constitutional: He is oriented to person, place, and time.  Non-toxic appearance. He does not appear ill. No distress.   HENT:   Head: Normocephalic and atraumatic.   Eyes: Conjunctivae are normal. Right eye exhibits no discharge. No scleral icterus.   Cardiovascular: Normal rate, regular rhythm and normal heart sounds.   Pulmonary/Chest: Effort normal and breath sounds normal. No respiratory distress. He has no wheezes.   Abdominal: Normal appearance.   Musculoskeletal: Normal range of motion.      Right lower leg: No edema.      Left lower leg: No edema.   Lymphadenopathy:     He has no cervical adenopathy.   Neurological: He is alert and oriented to person, place, and time.   Skin: Skin is warm. No bruising noted. He is not diaphoretic. No jaundice.   Psychiatric: His behavior is normal. Mood normal.   Nursing note and vitals reviewed.        RECENT LABS    WBC   Date Value Ref Range Status   03/17/2025 10.67 3.40 - 10.80 10*3/mm3 Final     RBC   Date Value Ref Range Status   03/17/2025 5.22 4.14 - 5.80 10*6/mm3 Final     Hemoglobin   Date Value Ref Range Status "   03/17/2025 15.5 13.0 - 17.7 g/dL Final     Hematocrit   Date Value Ref Range Status   03/17/2025 46.4 37.5 - 51.0 % Final     MCV   Date Value Ref Range Status   03/17/2025 88.9 79.0 - 97.0 fL Final     MCH   Date Value Ref Range Status   03/17/2025 29.7 26.6 - 33.0 pg Final     MCHC   Date Value Ref Range Status   03/17/2025 33.4 31.5 - 35.7 g/dL Final     RDW   Date Value Ref Range Status   03/17/2025 12.9 12.3 - 15.4 % Final     RDW-SD   Date Value Ref Range Status   03/17/2025 41.9 37.0 - 54.0 fl Final     MPV   Date Value Ref Range Status   03/17/2025 11.9 6.0 - 12.0 fL Final     Platelets   Date Value Ref Range Status   03/17/2025 282 140 - 450 10*3/mm3 Final     Neutrophil %   Date Value Ref Range Status   03/17/2025 76.8 (H) 42.7 - 76.0 % Final     Lymphocyte %   Date Value Ref Range Status   03/17/2025 16.4 (L) 19.6 - 45.3 % Final     Monocyte %   Date Value Ref Range Status   03/17/2025 4.1 (L) 5.0 - 12.0 % Final     Eosinophil %   Date Value Ref Range Status   03/17/2025 2.2 0.3 - 6.2 % Final     Basophil %   Date Value Ref Range Status   03/17/2025 0.5 0.0 - 1.5 % Final     Neutrophils, Absolute   Date Value Ref Range Status   03/17/2025 8.20 (H) 1.70 - 7.00 10*3/mm3 Final     Lymphocytes, Absolute   Date Value Ref Range Status   03/17/2025 1.75 0.70 - 3.10 10*3/mm3 Final     Monocytes, Absolute   Date Value Ref Range Status   03/17/2025 0.44 0.10 - 0.90 10*3/mm3 Final     Eosinophils, Absolute   Date Value Ref Range Status   03/17/2025 0.23 0.00 - 0.40 10*3/mm3 Final     Basophils, Absolute   Date Value Ref Range Status   03/17/2025 0.05 0.00 - 0.20 10*3/mm3 Final     nRBC   Date Value Ref Range Status   01/14/2019 0 0 /100[WBCs] Final       Lab Results   Component Value Date    GLUCOSE 92 09/09/2024    BUN 17 09/09/2024    CREATININE 1.12 09/09/2024    EGFRIFNONA 78 02/22/2022    BCR 15.2 09/09/2024    K 4.4 09/09/2024    CO2 27.3 09/09/2024    CALCIUM 10.2 09/09/2024    ALBUMIN 4.8 09/09/2024    AST  30 09/09/2024    ALT 63 (H) 09/09/2024     Assessment & Plan     Deep vein thrombosis (DVT) of non-extremity vein, unspecified chronicity  - CBC & Differential  - Comprehensive Metabolic Panel    Thrombophilia    Chronic anticoagulation          ASSESSMENT:    Chronic deep venous thrombosis involving the left lower extremity .  Ongoing management  History of positive lupus anticoagulant: Ongoing management  Anticoagulation management: Continue Eliquis 2.5 mg p.o. twice daily  Patient has strong family history of cancer on the maternal side of the family.  His mother’s comprehensive gene test was negative.  Patient does not need genetic testing at this time unless there is family history of cancer on the paternal side.     Assessment has been reviewed    PLANS:    CBC reviewed with the patient  CMP ordered and pending  Continue Eliquis 2.5 mg twice daily.    Educated patient on signs and symptoms of acute clots  Call for any new or unusual worsening or bleeding  Patient advised to call for any planned procedures necessitating holding of Eliquis.  Follow-up in 6 months with Dr. Bowman or sooner if needed     I have reviewed lab results, imaging, vitals, and medications with the patient today.  Patient verbalized understanding and is in agreement with the above plan of care.

## 2025-03-17 ENCOUNTER — LAB (OUTPATIENT)
Dept: LAB | Facility: HOSPITAL | Age: 45
End: 2025-03-17
Payer: MEDICAID

## 2025-03-17 ENCOUNTER — OFFICE VISIT (OUTPATIENT)
Dept: ONCOLOGY | Facility: CLINIC | Age: 45
End: 2025-03-17
Payer: MEDICAID

## 2025-03-17 VITALS
OXYGEN SATURATION: 97 % | DIASTOLIC BLOOD PRESSURE: 80 MMHG | TEMPERATURE: 98.2 F | BODY MASS INDEX: 38.8 KG/M2 | SYSTOLIC BLOOD PRESSURE: 119 MMHG | HEIGHT: 69 IN | HEART RATE: 70 BPM | WEIGHT: 262 LBS

## 2025-03-17 DIAGNOSIS — D68.59 THROMBOPHILIA: Primary | ICD-10-CM

## 2025-03-17 DIAGNOSIS — D68.59 THROMBOPHILIA: ICD-10-CM

## 2025-03-17 DIAGNOSIS — Z79.01 CHRONIC ANTICOAGULATION: ICD-10-CM

## 2025-03-17 DIAGNOSIS — I82.90 DEEP VEIN THROMBOSIS (DVT) OF NON-EXTREMITY VEIN, UNSPECIFIED CHRONICITY: ICD-10-CM

## 2025-03-17 DIAGNOSIS — I82.90 DEEP VEIN THROMBOSIS (DVT) OF NON-EXTREMITY VEIN, UNSPECIFIED CHRONICITY: Primary | ICD-10-CM

## 2025-03-17 LAB
ALBUMIN SERPL-MCNC: 4.5 G/DL (ref 3.5–5.2)
ALBUMIN/GLOB SERPL: 1.7 G/DL
ALP SERPL-CCNC: 101 U/L (ref 39–117)
ALT SERPL W P-5'-P-CCNC: 40 U/L (ref 1–41)
ANION GAP SERPL CALCULATED.3IONS-SCNC: 10 MMOL/L (ref 5–15)
AST SERPL-CCNC: 22 U/L (ref 1–40)
BASOPHILS # BLD AUTO: 0.05 10*3/MM3 (ref 0–0.2)
BASOPHILS NFR BLD AUTO: 0.5 % (ref 0–1.5)
BILIRUB SERPL-MCNC: 1.3 MG/DL (ref 0–1.2)
BUN SERPL-MCNC: 12 MG/DL (ref 6–20)
BUN/CREAT SERPL: 11.2 (ref 7–25)
CALCIUM SPEC-SCNC: 9.7 MG/DL (ref 8.6–10.5)
CHLORIDE SERPL-SCNC: 106 MMOL/L (ref 98–107)
CO2 SERPL-SCNC: 26 MMOL/L (ref 22–29)
CREAT SERPL-MCNC: 1.07 MG/DL (ref 0.76–1.27)
DEPRECATED RDW RBC AUTO: 41.9 FL (ref 37–54)
EGFRCR SERPLBLD CKD-EPI 2021: 88.9 ML/MIN/1.73
EOSINOPHIL # BLD AUTO: 0.23 10*3/MM3 (ref 0–0.4)
EOSINOPHIL NFR BLD AUTO: 2.2 % (ref 0.3–6.2)
ERYTHROCYTE [DISTWIDTH] IN BLOOD BY AUTOMATED COUNT: 12.9 % (ref 12.3–15.4)
GLOBULIN UR ELPH-MCNC: 2.6 GM/DL
GLUCOSE SERPL-MCNC: 99 MG/DL (ref 65–99)
HCT VFR BLD AUTO: 46.4 % (ref 37.5–51)
HGB BLD-MCNC: 15.5 G/DL (ref 13–17.7)
HOLD SPECIMEN: NORMAL
LYMPHOCYTES # BLD AUTO: 1.75 10*3/MM3 (ref 0.7–3.1)
LYMPHOCYTES NFR BLD AUTO: 16.4 % (ref 19.6–45.3)
MCH RBC QN AUTO: 29.7 PG (ref 26.6–33)
MCHC RBC AUTO-ENTMCNC: 33.4 G/DL (ref 31.5–35.7)
MCV RBC AUTO: 88.9 FL (ref 79–97)
MONOCYTES # BLD AUTO: 0.44 10*3/MM3 (ref 0.1–0.9)
MONOCYTES NFR BLD AUTO: 4.1 % (ref 5–12)
NEUTROPHILS NFR BLD AUTO: 76.8 % (ref 42.7–76)
NEUTROPHILS NFR BLD AUTO: 8.2 10*3/MM3 (ref 1.7–7)
PLATELET # BLD AUTO: 282 10*3/MM3 (ref 140–450)
PMV BLD AUTO: 11.9 FL (ref 6–12)
POTASSIUM SERPL-SCNC: 4.4 MMOL/L (ref 3.5–5.2)
PROT SERPL-MCNC: 7.1 G/DL (ref 6–8.5)
RBC # BLD AUTO: 5.22 10*6/MM3 (ref 4.14–5.8)
SODIUM SERPL-SCNC: 142 MMOL/L (ref 136–145)
WBC NRBC COR # BLD AUTO: 10.67 10*3/MM3 (ref 3.4–10.8)
WHOLE BLOOD HOLD COAG: NORMAL

## 2025-03-17 PROCEDURE — 85025 COMPLETE CBC W/AUTO DIFF WBC: CPT

## 2025-03-17 PROCEDURE — 80053 COMPREHEN METABOLIC PANEL: CPT | Performed by: NURSE PRACTITIONER

## 2025-03-17 PROCEDURE — 36415 COLL VENOUS BLD VENIPUNCTURE: CPT

## 2025-06-09 DIAGNOSIS — D68.59 THROMBOPHILIA: ICD-10-CM

## 2025-07-16 DIAGNOSIS — D68.59 THROMBOPHILIA: ICD-10-CM
